# Patient Record
Sex: FEMALE | Race: WHITE | NOT HISPANIC OR LATINO | Employment: FULL TIME | URBAN - METROPOLITAN AREA
[De-identification: names, ages, dates, MRNs, and addresses within clinical notes are randomized per-mention and may not be internally consistent; named-entity substitution may affect disease eponyms.]

---

## 2017-02-20 ENCOUNTER — OFFICE VISIT - HEALTHEAST (OUTPATIENT)
Dept: FAMILY MEDICINE | Facility: CLINIC | Age: 22
End: 2017-02-20

## 2017-02-20 DIAGNOSIS — F41.1 ANXIETY STATE: ICD-10-CM

## 2017-02-20 ASSESSMENT — MIFFLIN-ST. JEOR: SCORE: 1540.22

## 2017-05-11 ENCOUNTER — COMMUNICATION - HEALTHEAST (OUTPATIENT)
Dept: FAMILY MEDICINE | Facility: CLINIC | Age: 22
End: 2017-05-11

## 2017-05-11 DIAGNOSIS — F41.1 ANXIETY STATE: ICD-10-CM

## 2017-05-31 ENCOUNTER — RECORDS - HEALTHEAST (OUTPATIENT)
Dept: ADMINISTRATIVE | Facility: OTHER | Age: 22
End: 2017-05-31

## 2017-08-01 ENCOUNTER — OFFICE VISIT - HEALTHEAST (OUTPATIENT)
Dept: FAMILY MEDICINE | Facility: CLINIC | Age: 22
End: 2017-08-01

## 2017-08-01 DIAGNOSIS — D48.5 NEOPLASM OF UNCERTAIN BEHAVIOR OF SKIN: ICD-10-CM

## 2017-08-01 DIAGNOSIS — Z30.41 SURVEILLANCE OF CONTRACEPTIVE PILL: ICD-10-CM

## 2017-08-01 DIAGNOSIS — Z00.00 ROUTINE GENERAL MEDICAL EXAMINATION AT A HEALTH CARE FACILITY: ICD-10-CM

## 2017-08-01 DIAGNOSIS — F41.1 ANXIETY STATE: ICD-10-CM

## 2017-08-01 DIAGNOSIS — R82.90 ABNORMAL URINALYSIS: ICD-10-CM

## 2017-08-01 LAB
CHOLEST SERPL-MCNC: 230 MG/DL
FASTING STATUS PATIENT QL REPORTED: NO
HDLC SERPL-MCNC: 64 MG/DL
LDLC SERPL CALC-MCNC: 151 MG/DL
TRIGL SERPL-MCNC: 74 MG/DL

## 2017-08-01 ASSESSMENT — MIFFLIN-ST. JEOR: SCORE: 1550.1

## 2017-08-04 LAB
HPV INTERPRETATION - HISTORICAL: NORMAL
HPV INTERPRETER - HISTORICAL: NORMAL

## 2017-08-23 ENCOUNTER — COMMUNICATION - HEALTHEAST (OUTPATIENT)
Dept: FAMILY MEDICINE | Facility: CLINIC | Age: 22
End: 2017-08-23

## 2017-08-23 DIAGNOSIS — F41.1 ANXIETY STATE: ICD-10-CM

## 2018-08-27 ENCOUNTER — COMMUNICATION - HEALTHEAST (OUTPATIENT)
Dept: FAMILY MEDICINE | Facility: CLINIC | Age: 23
End: 2018-08-27

## 2018-08-27 DIAGNOSIS — Z30.41 SURVEILLANCE OF CONTRACEPTIVE PILL: ICD-10-CM

## 2020-06-16 ENCOUNTER — OFFICE VISIT - HEALTHEAST (OUTPATIENT)
Dept: FAMILY MEDICINE | Facility: CLINIC | Age: 25
End: 2020-06-16

## 2020-06-16 DIAGNOSIS — R21 RASH: ICD-10-CM

## 2020-06-16 DIAGNOSIS — D70.9 NEUTROPENIA, UNSPECIFIED TYPE (H): ICD-10-CM

## 2020-06-16 DIAGNOSIS — R82.90 ABNORMAL URINALYSIS: ICD-10-CM

## 2020-06-16 DIAGNOSIS — K59.01 SLOW TRANSIT CONSTIPATION: ICD-10-CM

## 2020-06-16 ASSESSMENT — PATIENT HEALTH QUESTIONNAIRE - PHQ9: SUM OF ALL RESPONSES TO PHQ QUESTIONS 1-9: 0

## 2020-06-16 ASSESSMENT — ANXIETY QUESTIONNAIRES
6. BECOMING EASILY ANNOYED OR IRRITABLE: NOT AT ALL
2. NOT BEING ABLE TO STOP OR CONTROL WORRYING: NOT AT ALL
1. FEELING NERVOUS, ANXIOUS, OR ON EDGE: SEVERAL DAYS
IF YOU CHECKED OFF ANY PROBLEMS ON THIS QUESTIONNAIRE, HOW DIFFICULT HAVE THESE PROBLEMS MADE IT FOR YOU TO DO YOUR WORK, TAKE CARE OF THINGS AT HOME, OR GET ALONG WITH OTHER PEOPLE: NOT DIFFICULT AT ALL
3. WORRYING TOO MUCH ABOUT DIFFERENT THINGS: NOT AT ALL
7. FEELING AFRAID AS IF SOMETHING AWFUL MIGHT HAPPEN: NOT AT ALL
5. BEING SO RESTLESS THAT IT IS HARD TO SIT STILL: NOT AT ALL
GAD7 TOTAL SCORE: 1
4. TROUBLE RELAXING: NOT AT ALL

## 2020-06-18 ENCOUNTER — AMBULATORY - HEALTHEAST (OUTPATIENT)
Dept: LAB | Facility: CLINIC | Age: 25
End: 2020-06-18

## 2020-06-18 DIAGNOSIS — R82.90 ABNORMAL URINALYSIS: ICD-10-CM

## 2020-06-18 DIAGNOSIS — K59.01 SLOW TRANSIT CONSTIPATION: ICD-10-CM

## 2020-06-18 DIAGNOSIS — R21 RASH: ICD-10-CM

## 2020-06-18 DIAGNOSIS — D70.9 NEUTROPENIA, UNSPECIFIED TYPE (H): ICD-10-CM

## 2020-06-18 LAB
ALBUMIN SERPL-MCNC: 4 G/DL (ref 3.5–5)
ALBUMIN UR-MCNC: NEGATIVE MG/DL
ALP SERPL-CCNC: 48 U/L (ref 45–120)
ALT SERPL W P-5'-P-CCNC: 11 U/L (ref 0–45)
ANION GAP SERPL CALCULATED.3IONS-SCNC: 9 MMOL/L (ref 5–18)
APPEARANCE UR: CLEAR
AST SERPL W P-5'-P-CCNC: 13 U/L (ref 0–40)
BACTERIA #/AREA URNS HPF: ABNORMAL HPF
BASOPHILS # BLD AUTO: 0 THOU/UL (ref 0–0.2)
BASOPHILS NFR BLD AUTO: 0 % (ref 0–2)
BILIRUB SERPL-MCNC: 0.4 MG/DL (ref 0–1)
BILIRUB UR QL STRIP: NEGATIVE
BUN SERPL-MCNC: 14 MG/DL (ref 8–22)
C REACTIVE PROTEIN LHE: 0.4 MG/DL (ref 0–0.8)
CALCIUM SERPL-MCNC: 9.6 MG/DL (ref 8.5–10.5)
CHLORIDE BLD-SCNC: 104 MMOL/L (ref 98–107)
CO2 SERPL-SCNC: 25 MMOL/L (ref 22–31)
COLOR UR AUTO: YELLOW
CREAT SERPL-MCNC: 0.89 MG/DL (ref 0.6–1.1)
EOSINOPHIL # BLD AUTO: 0.1 THOU/UL (ref 0–0.4)
EOSINOPHIL NFR BLD AUTO: 2 % (ref 0–6)
ERYTHROCYTE [DISTWIDTH] IN BLOOD BY AUTOMATED COUNT: 11.2 % (ref 11–14.5)
ERYTHROCYTE [SEDIMENTATION RATE] IN BLOOD BY WESTERGREN METHOD: 13 MM/HR (ref 0–20)
GFR SERPL CREATININE-BSD FRML MDRD: >60 ML/MIN/1.73M2
GLUCOSE BLD-MCNC: 87 MG/DL (ref 70–125)
GLUCOSE UR STRIP-MCNC: NEGATIVE MG/DL
HCT VFR BLD AUTO: 37 % (ref 35–47)
HGB BLD-MCNC: 12.8 G/DL (ref 12–16)
HGB UR QL STRIP: ABNORMAL
KETONES UR STRIP-MCNC: NEGATIVE MG/DL
LEUKOCYTE ESTERASE UR QL STRIP: NEGATIVE
LYMPHOCYTES # BLD AUTO: 1.6 THOU/UL (ref 0.8–4.4)
LYMPHOCYTES NFR BLD AUTO: 29 % (ref 20–40)
MCH RBC QN AUTO: 30.7 PG (ref 27–34)
MCHC RBC AUTO-ENTMCNC: 34.5 G/DL (ref 32–36)
MCV RBC AUTO: 89 FL (ref 80–100)
MONOCYTES # BLD AUTO: 0.4 THOU/UL (ref 0–0.9)
MONOCYTES NFR BLD AUTO: 7 % (ref 2–10)
NEUTROPHILS # BLD AUTO: 3.5 THOU/UL (ref 2–7.7)
NEUTROPHILS NFR BLD AUTO: 63 % (ref 50–70)
NITRATE UR QL: NEGATIVE
PH UR STRIP: 6.5 [PH] (ref 5–8)
PLATELET # BLD AUTO: 317 THOU/UL (ref 140–440)
PMV BLD AUTO: 6.9 FL (ref 7–10)
POTASSIUM BLD-SCNC: 4.5 MMOL/L (ref 3.5–5)
PROT SERPL-MCNC: 7.3 G/DL (ref 6–8)
RBC # BLD AUTO: 4.15 MILL/UL (ref 3.8–5.4)
RBC #/AREA URNS AUTO: ABNORMAL HPF
RHEUMATOID FACT SERPL-ACNC: <15 IU/ML (ref 0–30)
SODIUM SERPL-SCNC: 138 MMOL/L (ref 136–145)
SP GR UR STRIP: 1.01 (ref 1–1.03)
SQUAMOUS #/AREA URNS AUTO: ABNORMAL LPF
TSH SERPL DL<=0.005 MIU/L-ACNC: 1.18 UIU/ML (ref 0.3–5)
UROBILINOGEN UR STRIP-ACNC: ABNORMAL
WBC #/AREA URNS AUTO: ABNORMAL HPF
WBC: 5.6 THOU/UL (ref 4–11)

## 2020-06-19 ENCOUNTER — COMMUNICATION - HEALTHEAST (OUTPATIENT)
Dept: FAMILY MEDICINE | Facility: CLINIC | Age: 25
End: 2020-06-19

## 2020-06-19 LAB
25(OH)D3 SERPL-MCNC: 29.9 NG/ML (ref 30–80)
25(OH)D3 SERPL-MCNC: 29.9 NG/ML (ref 30–80)
ANA SER QL: 0.9 U
B BURGDOR IGG+IGM SER QL: 0.64 INDEX VALUE
BACTERIA SPEC CULT: NO GROWTH

## 2020-11-28 ENCOUNTER — COMMUNICATION - HEALTHEAST (OUTPATIENT)
Dept: FAMILY MEDICINE | Facility: CLINIC | Age: 25
End: 2020-11-28

## 2020-11-28 DIAGNOSIS — Z30.41 SURVEILLANCE OF CONTRACEPTIVE PILL: ICD-10-CM

## 2020-11-28 DIAGNOSIS — F32.9 MAJOR DEPRESSIVE DISORDER WITH SINGLE EPISODE, REMISSION STATUS UNSPECIFIED: ICD-10-CM

## 2020-11-30 RX ORDER — NORGESTIMATE AND ETHINYL ESTRADIOL 7DAYSX3 28
1 KIT ORAL DAILY
Qty: 2 PACKAGE | Refills: 0 | Status: SHIPPED | OUTPATIENT
Start: 2020-11-30 | End: 2022-11-10

## 2020-11-30 RX ORDER — BUPROPION HYDROCHLORIDE 150 MG/1
150 TABLET ORAL EVERY MORNING
Qty: 60 TABLET | Refills: 0 | Status: SHIPPED | OUTPATIENT
Start: 2020-11-30 | End: 2021-07-19

## 2021-05-27 ASSESSMENT — PATIENT HEALTH QUESTIONNAIRE - PHQ9: SUM OF ALL RESPONSES TO PHQ QUESTIONS 1-9: 0

## 2021-05-28 ASSESSMENT — ANXIETY QUESTIONNAIRES: GAD7 TOTAL SCORE: 1

## 2021-05-30 VITALS — HEIGHT: 68 IN | WEIGHT: 166.6 LBS | BODY MASS INDEX: 25.25 KG/M2

## 2021-05-30 VITALS — BODY MASS INDEX: 25.98 KG/M2 | WEIGHT: 169.6 LBS | HEIGHT: 68 IN | BODY MASS INDEX: 25.52 KG/M2

## 2021-05-31 VITALS — WEIGHT: 171.4 LBS | BODY MASS INDEX: 26.9 KG/M2 | HEIGHT: 67 IN

## 2021-06-08 NOTE — PROGRESS NOTES
"Arcelia Andersen is a 25 y.o. female who is being evaluated via a billable video visit.      The patient has been notified of following:     \"This video visit will be conducted via a call between you and your physician/provider. We have found that certain health care needs can be provided without the need for an in-person physical exam.  This service lets us provide the care you need with a video conversation.  If a prescription is necessary we can send it directly to your pharmacy.  If lab work is needed we can place an order for that and you can then stop by our lab to have the test done at a later time.    Video visits are billed at different rates depending on your insurance coverage. Please reach out to your insurance provider with any questions.    If during the course of the call the physician/provider feels a video visit is not appropriate, you will not be charged for this service.\"    Patient has given verbal consent to a Video visit? Yes    Will anyone else be joining your video visit? No        Video Start Time: 131 pm    Additional provider notes:   Patient is seen today via video visit rather than office visit due to the COVID 19 outbreak pandemic.  This is done for the protection of patient from potential exposure to this virus by coming to the clinic.    PROGRESS NOTE   6/16/2020    SUBJECTIVE:  Arcelia Andersen is a 25 y.o. female  who presents for   Chief Complaint   Patient presents with     Hematuria     Low white blood cell count and blood in her urine while living in [place]. Moved back to [place] and would like to follow up      Rash     Rash on nose and cheeks for over a year      Patient is being evaluated today for follow-up of some abnormalities that were found when she was living in [place].  She just recently moved back to [place] and is going to be staying back in [place] so would like to follow-up on these.  She was noted to have a low white blood cell count and blood in her urine " while she was living in [place].  She was supposed to follow-up but never did.  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  She starts her job at [employer] on 6/29/2020.  She notes that last fall she was in to see her primary doctor in [place] and they found some blood in her urine.  They also found at that time that she had low white cell counts.  He has been feeling well and did have a physical last fall.  Everything else seemed to be fine except she had the blood in her urine.  Her provider in [place] wanted to refer her to hematology for this combination of symptoms but she never went because they moved.  She does note that she is also had a rash on the upper part of her cheeks and on the tip of her nose.  This is been there for probably the last year or so.  She definitely notes that she has some sun sensitivity to that it seems like her nose is very flaky and her cheeks are also flaky at certain times.  It seems like it gets a little bit better and then it seems to get worse again.  She is tried numerous different lotions and moisturizing creams and nothing really seems to help.  She did try some hydrocortisone cream thinking that perhaps that would help and that helped a little bit but not to a significant extent.  She otherwise has overall been feeling well.    Patient Active Problem List   Diagnosis     Sore Throat     Major Depression, Single Episode     Anxiety     Anemia     Blood In Urine       Current Outpatient Medications   Medication Sig Dispense Refill     buPROPion (WELLBUTRIN XL) 150 MG 24 hr tablet Take 150 mg by mouth.       TRINESSA, 28, 0.18/0.215/0.25 mg-35 mcg (28) Tab tablet TAKE 1 TABLET BY MOUTH DAILY 84 tablet 0     No current facility-administered medications for this visit.        Allergies   Allergen Reactions     Tree Nut Hives, Itching and Shortness Of Breath     Nuts - Unspecified Hives and Itching     Annotation: tree nuts cause throat  swelling         Past Medical History:   Diagnosis Date     Anxiety      Major depressive disorder, recurrent episode, unspecified        No past surgical history on file.    Social History     Tobacco Use   Smoking Status Never Smoker   Smokeless Tobacco Never Used       OBJECTIVE:     There were no vitals taken for this visit.    Physical Exam:  GENERAL APPEARANCE: A&A, NAD, well hydrated, well nourished  Patient is fully active and active participant in the video visit today.  There is not appear to be any significant abnormalities noted on exam.  She does have some redness over her cheek area on her face bilaterally as well as on the tip of her nose.  There is no evidence for secondary infection and she does not feel any warmth to the touch.  SKIN:  Normal skin turgor, no lesions/rashes   EXTREMITY: no swelling noted.  Full range of motion of all 4 extremities.   NEURO: no gross deficits   PSYCHIATRIC;  Mood appropriate, memory intact    LABS:     Recent Results (from the past 240 hour(s))   Antinuclear Antibody () Cascade   Result Value Ref Range     Screen Cascade 0.9 <=2.9 U   Comprehensive Metabolic Panel   Result Value Ref Range    Sodium 138 136 - 145 mmol/L    Potassium 4.5 3.5 - 5.0 mmol/L    Chloride 104 98 - 107 mmol/L    CO2 25 22 - 31 mmol/L    Anion Gap, Calculation 9 5 - 18 mmol/L    Glucose 87 70 - 125 mg/dL    BUN 14 8 - 22 mg/dL    Creatinine 0.89 0.60 - 1.10 mg/dL    GFR MDRD Af Amer >60 >60 mL/min/1.73m2    GFR MDRD Non Af Amer >60 >60 mL/min/1.73m2    Bilirubin, Total 0.4 0.0 - 1.0 mg/dL    Calcium 9.6 8.5 - 10.5 mg/dL    Protein, Total 7.3 6.0 - 8.0 g/dL    Albumin 4.0 3.5 - 5.0 g/dL    Alkaline Phosphatase 48 45 - 120 U/L    AST 13 0 - 40 U/L    ALT 11 0 - 45 U/L   C-Reactive Protein   Result Value Ref Range    CRP 0.4 0.0 - 0.8 mg/dL   Lyme Antibody Cascade   Result Value Ref Range    Lyme Antibody Cascade 0.64 <0.90 Index Value   Rheumatoid Factor Quant   Result Value Ref Range     Rheumatoid Factor Quantitative <15.0 0 - 30 IU/mL   Erythrocyte Sedimentation Rate   Result Value Ref Range    Sed Rate 13 0 - 20 mm/hr   Vitamin D, Total (25-Hydroxy)   Result Value Ref Range    Vitamin D, Total (25-Hydroxy) 29.9 (L) 30.0 - 80.0 ng/mL   Thyroid Cascade   Result Value Ref Range    TSH 1.18 0.30 - 5.00 uIU/mL   HM1 (CBC with Diff)   Result Value Ref Range    WBC 5.6 4.0 - 11.0 thou/uL    RBC 4.15 3.80 - 5.40 mill/uL    Hemoglobin 12.8 12.0 - 16.0 g/dL    Hematocrit 37.0 35.0 - 47.0 %    MCV 89 80 - 100 fL    MCH 30.7 27.0 - 34.0 pg    MCHC 34.5 32.0 - 36.0 g/dL    RDW 11.2 11.0 - 14.5 %    Platelets 317 140 - 440 thou/uL    MPV 6.9 (L) 7.0 - 10.0 fL    Neutrophils % 63 50 - 70 %    Lymphocytes % 29 20 - 40 %    Monocytes % 7 2 - 10 %    Eosinophils % 2 0 - 6 %    Basophils % 0 0 - 2 %    Neutrophils Absolute 3.5 2.0 - 7.7 thou/uL    Lymphocytes Absolute 1.6 0.8 - 4.4 thou/uL    Monocytes Absolute 0.4 0.0 - 0.9 thou/uL    Eosinophils Absolute 0.1 0.0 - 0.4 thou/uL    Basophils Absolute 0.0 0.0 - 0.2 thou/uL   Urinalysis Macroscopic   Result Value Ref Range    Color, UA Yellow Colorless, Yellow, Straw, Light Yellow    Clarity, UA Clear Clear    Glucose, UA Negative Negative    Bilirubin, UA Negative Negative    Ketones, UA Negative Negative    Specific Gravity, UA 1.015 1.005 - 1.030    Blood, UA Moderate (!) Negative    pH, UA 6.5 5.0 - 8.0    Protein, UA Negative Negative mg/dL    Urobilinogen, UA 0.2 E.U./dL 0.2 E.U./dL, 1.0 E.U./dL    Nitrite, UA Negative Negative    Leukocytes, UA Negative Negative   Culture, Urine    Specimen: Urine   Result Value Ref Range    Culture No Growth    Urine,Microscopic   Result Value Ref Range    Bacteria, UA None Seen None Seen hpf    RBC, UA 10-25 (!) None Seen, 0-2 hpf    WBC, UA 0-5 None Seen, 0-5 hpf    Squam Epithel, UA 0-5 None Seen, 0-5 lpf       ASSESSMENT/PLAN:     Neutropenia, unspecified type (H) [D70.9]      1. Neutropenia, unspecified type (H)  -  HM1(CBC and Differential); Future    2. Abnormal urinalysis  - Urinalysis Macroscopic; Future  - Urine,Microscopic; Future  - Culture, Urine; Future    3. Rash  - Antinuclear Antibody () Cascade; Future  - Comprehensive Metabolic Panel; Future  - C-Reactive Protein; Future  - Lyme Antibody Cascade; Future  - Rheumatoid Factor Quant; Future  - Erythrocyte Sedimentation Rate; Future  - Vitamin D, Total (25-Hydroxy); Future  - Thyroid Cascade; Future    4. Slow transit constipation  - Thyroid Cascade; Future      Patient is desiring follow-up from a low white blood cell count that was found last fall as well as blood in her urine.  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  She would like to follow-up with the abnormalities that were found in [place].  I think the first step is to repeat the urinalysis today as well as a CBC to see if indeed she continues to have a low white blood cell count as well as blood in her urine.  Urinalysis as well as a CBC were ordered today.  She will make an appointment for a lab only visit to have these drawn.  She does have a rash on her nose as well as on her cheek region.  She has a brother-in-law who is a doctor and they expressed that they were concerned about possible autoimmune issues that could be contributing to the rash that she has had.  She is had this rash now for about the last year or so.  We will go ahead and order  as well as metabolic panel C-reactive protein Lyme titer rheumatoid factor sed rate vitamin D and thyroid cascade to evaluate for possible autoimmune issues which could be contributing to the rash.  It does not seem like the rash is getting any worse but it is also not getting any better.  I will contact her with results of the labs when they return.  She has not had any symptoms that we suggest of her thyroid level being off other than she will  have constipation.  We discussed that that may be thyroid related and so again we will check a thyroid level for that reason as well.  All of patient's questions were answered today.  She has additional questions or concerns will let me know.  I will contact her with results of the lab work and that we can decide on further work-up following that.        Video-Visit Details    Type of service:  Video Visit    Video End Time (time video stopped): 144 pm  Originating Location (pt. Location): Home    Distant Location (provider location):  [facility]     Platform used for Video Visit: Ketan Ferguson MD

## 2021-06-09 NOTE — PROGRESS NOTES
PROGRESS NOTE   2/20/2017    SUBJECTIVE:  Arcelia French is a 21 y.o. female  who presents for   Chief Complaint   Patient presents with     Depression     talk would like to restart medications     Anxiety     Patient comes in today because she like to restart her medications for anxiety.  She has been on anxiety medications in the past which worked well.  She went off of them when she joined the  because she thought she could not be on them when she was in the .  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  She continues in school in [place] and school is going well.  She is doing very well in her classes and is quite pleased with how everything is going.  She overall feels like things are doing fine but she finds herself worrying about things that she really should not be worried about.  She gets super nervous about upcoming social events.  She will worry about how the conversations are going and she will be playing how the conversation might go even before she has the conversation.  She is very worried about the future and not necessarily what she is going to do in the future but just what may happen tomorrow in her day-to-day lives.  She is always thinking that she is room to school because she did not study the night before but overall she is doing well in school and has nothing to worry about in terms of her grades are her classes.  Please see PHQ 9 and ADY which were both completed in their entirety today.  She is not suicidal or homicidal.  She never hurt herself but she wonders sometimes what is the point of life.  She notes that before she went into the  she was on Celexa and that was working very well for her anxiety.  She would like to restart that.  She is fairly certain that she just needs to be on medications for this as several of her sisters as well as her dad are all on medication  "and have been helped tremendously by this.     Patient Active Problem List   Diagnosis     Sore Throat     Major Depression, Single Episode     Anxiety     Anemia     Blood In Urine       Current Outpatient Prescriptions   Medication Sig Dispense Refill     norgestimate-ethinyl estradiol (TRINESSA, 28,) 0.18/0.215/0.25 mg-35 mcg (28) Tab tablet Take 1 tablet by mouth daily. 84 tablet 4     citalopram (CELEXA) 20 MG tablet Take 1 tablet (20 mg total) by mouth daily. 90 tablet 0     No current facility-administered medications for this visit.        Allergies   Allergen Reactions     Nuts - Unspecified Hives and Itching     Annotation: tree nuts cause throat swelling         Past Medical History:   Diagnosis Date     Anxiety      Major depressive disorder, recurrent episode, unspecified        History reviewed. No pertinent surgical history.    History   Smoking Status     Never Smoker   Smokeless Tobacco     Never Used       OBJECTIVE:     Visit Vitals     /62 (Patient Site: Left Arm, Patient Position: Sitting, Cuff Size: Adult Regular)     Pulse (!) 58  Comment: regular     Temp 98.2  F (36.8  C) (Oral)     Resp 12  Comment: regular     Ht 5' 7.75\" (1.721 m)     Wt 166 lb 9.6 oz (75.6 kg)     BMI 25.52 kg/m2       Physical Exam:  GENERAL APPEARANCE: A&A, NAD, well hydrated, well nourished  SKIN:  Normal skin turgor, no lesions/rashes   EXTREMITY: no edema   NEURO: no gross deficits   PSYCHIATRIC;  Mood appropriate, memory intact  A&O x3, thought processes congruent, non-tangential. No hallucinations/delusions. Insight/judgment: intact. Denies suicidal/homicidal ideations.        ASSESSMENT/PLAN:     Anxiety state [F41.1]      1. Anxiety  - citalopram (CELEXA) 20 MG tablet; Take 1 tablet (20 mg total) by mouth daily.  Dispense: 90 tablet; Refill: 0    Patient overall seems to be doing well.  She is not suicidal or homicidal.  She is finding that her anxiety is getting worse and she is wondering about things " that she really should not be worried about.  She like to restart Celexa as that has worked well for her in the past for anxiety.  I am going to go ahead and give her prescription for 3 month supply today.  She is leaving to go to [place] later today to go back to school and she is not sure when she will be back here.  School ends at the beginning of May and so for sure she will be back at the end of May and will follow-up at that time.  If she is back before then which is highly likely she will try to make a follow-up appointment with me to recheck how she is doing.  She has been on this previously and tolerated it well.  We did discuss potential side effects including nausea as well as a funny foggy feeling for the first couple of weeks that you are on the medication and she remembered that from last time and is aware that if she just keeps taking the medication this will all resolve within the first couple of weeks.  All of her questions were answered today.  Celexa was sent to the pharmacy today.  If she has any changes in her symptoms or has any problems will let me know we otherwise will see her hopefully in about a month or so but otherwise within about 3 months. Total time spent with patient was at least 25 minutes,  of which greater than fifty percent was spent in counselling and coordination of care of the above medical problems.    Sherrell Ferguson MD

## 2021-06-12 NOTE — PROGRESS NOTES
Arcelia French is a 22 y.o. here for a Pap smear and physical exam. Patient is overall doing well.  She continues to go to school in [place] and is enjoying that very much.  She continues on her TriNessa birth control and that seems to working well.  She is bleeding when she supposed to and not when she is not.  She notes that she did go off of this for a couple of months because she ran out and was not able to go get a refill and she had improvement in her constipation and diarrhea alternation.  We discussed that that would not be something that I would be suspicious would be caused by her birth control but certainly I be willing to change her to a different birth control pill and see if that is helpful.  She notes that at this point she would like to continue on the medication and as long as it is not a known side effect she would like to see how she does over the next few months.  We will go ahead and send a refill of her birth control pill to the pharmacy.  She also continues on Celexa for her anxiety.  This has worked very well for her.  She was on it for quite some time and had good results with it then went off of that and recently restarted it.  She feels like overall things are doing much better.  Please see PHQ 9 and ADY which were both completed in their entirety today.  She feels like she is wearing appropriately and not worrying about things that she should not be worried about as she was in the past.  She is not suicidal or homicidal.  She does not have any side effects from the Celexa.  She does need a refill of this medication and I will go ahead and give that to her today.  She also has a mole on her back it has been there all of her life but her sister says that it looks different than it has in the past.  She cannot see it but she is wanting she needs to see a dermatologist for that.    Healthy Habits:   Regular Exercise: Yes  Sunscreen Use: Yes  Healthy Diet: Yes  Dental Visits Regularly:  Yes  Seat Belt: Yes  Sexually active: Yes  Self Breast Exam Monthly:Yes  Hemoccults: No  Flex Sig: No  Colonoscopy: No  Lipid Profile: No  Glucose Screen: No  Prevention of Osteoporosis: No  Last Dexa: No  Guns at Home:  No  Domestic Violence:  No    Current Outpatient Medications Include:    Current Outpatient Prescriptions:      citalopram (CELEXA) 20 MG tablet, TAKE 1 TABLET(20 MG) BY MOUTH DAILY, Disp: 90 tablet, Rfl: 1     norgestimate-ethinyl estradiol (TRINESSA, 28,) 0.18/0.215/0.25 mg-35 mcg (28) Tab tablet, Take 1 tablet by mouth daily., Disp: 84 tablet, Rfl: 4    Allergies:    Allergies   Allergen Reactions     Nuts - Unspecified Hives and Itching     Annotation: tree nuts cause throat swelling         Past Medical History:   Diagnosis Date     Anxiety      Major depressive disorder, recurrent episode, unspecified        History reviewed. No pertinent surgical history.    Immunization History   Administered Date(s) Administered     DTaP, historic 1995, 1995, 1995, 09/24/1996, 01/24/2000     HPV Quadrivalent 08/08/2007, 09/16/2008, 06/14/2010     Hep A, historic 08/08/2007, 09/16/2008     Hep B, historic 04/20/1998, 05/27/1998, 11/11/1998     HiB, historic 1995, 1995, 1995, 09/24/1996     IPV 1995, 1995, 1995, 01/24/2000     Influenza, inj, historic 02/07/2012     MMR 07/02/1996     MMRV 08/08/2007     Meningococcal MCV4P 02/07/2012     PPD Test 08/14/2015     Td, historic 08/08/2007     Tdap 08/08/2007     Varicella 04/01/1997       Family History   Problem Relation Age of Onset     Asthma Father      Depression Father      Brain cancer Paternal Grandmother      Depression Sister      Depression Sister      Depression Paternal Aunt      several     Depression Paternal Grandfather      Diabetes Maternal Grandfather      Hypertension       family history of this       Social History     Social History     Marital status: Single     Spouse name: N/A      Number of children: 0     Years of education: N/A     Occupational History     student      Social History Main Topics     Smoking status: Never Smoker     Smokeless tobacco: Never Used     Alcohol use Yes      Comment: occasionally once a month     Drug use: No     Sexual activity: Yes     Partners: Male     Birth control/ protection: OCP     Other Topics Concern     Not on file     Social History Narrative       Last cholesterol:   Lab Results   Component Value Date    CHOL 230 (H) 08/01/2017    CHOL 218 (H) 06/24/2016    CHOL 180 10/20/2014     Lab Results   Component Value Date    HDL 64 08/01/2017    HDL 76 06/24/2016    HDL 62 10/20/2014     Lab Results   Component Value Date    LDLCALC 151 (H) 08/01/2017    LDLCALC 135 (H) 06/24/2016    LDLCALC 107 10/20/2014     Lab Results   Component Value Date    TRIG 74 08/01/2017    TRIG 36 06/24/2016    TRIG 56 10/20/2014       Last mammogram: never    Birth Control Method: OCP  High Risk/Behavior: none    PREVENTATIVE SERVICES  Preventative services were reviewed today, 8/1/2017    LMP: Patient's last menstrual period was 07/03/2017.  Menstrual Regularity: monthly  Flow: normal    REVIEW OF SYSTEMS:  Denies fever, chills, visual changes, fatigue, myalgias, nasal congestion, rhinorrhea, ear pain or discharge, sore throat, swollen glands, breast mass, nipple discharge, breast changes, abdominal pain, nausea, vomiting, diarrhea, constipation, cough, shortness of breath, chest pain, weight change, change in bowel habits, melena, rectal bleeding, dysuria, frequency, urgency, hematuria, polyuria, polydipsia, polyphagia, joint pain or swelling or erythema, edema, rash, weakness, paresthesias, vaginal discharge or bleeding or mood changes.  Remainder of review of systems was negative.      PHYSICAL EXAM:  On exam, patient is a WD, WN 22 y.o. female in NAD.  BP 96/58 (Patient Site: Left Arm, Patient Position: Sitting, Cuff Size: Adult Regular)  Pulse 68 Comment: regular   "Temp 98.1  F (36.7  C) (Oral)   Resp 14 Comment: REGULAR  Ht 5' 7\" (1.702 m)  Wt 171 lb 6.4 oz (77.7 kg)  LMP 07/03/2017  Breastfeeding? No  BMI 26.85 kg/m2  Head normocephalic, atraumatic.  Eyes PERRL, ears TM s clear bilaterally.  Throat without significant erythemia or exudate.  Neck was supple, full range of motion. No significant lymphadenopathy or thyromegaly was appreciated.  Lungs clear to auscultation  Heart regular rate and rhythm.  Breast exam was done. No masses were appreciated. Axilla were clear bilaterally. No nipple discharge was appreciated. Self breast exam was reviewed and taught today.  Abdomen was soft, nontender, nondistended. No masses or organomegaly were palpated. Positive bowel sounds were appreciated.  Extremities with full range of motion of all 4 extremities were noted.  Deep tendon reflexes were equal and symmetrical. Motor and sensation were intact to both the upper and lower extremities.  Cranial nerves 2 through 12 were grossly intact.  EOM were intact.  Pelvic exam was done.  External genitalia appeared normal. Speculum was introduced and the Pap smear obtained. Bimanual exam revealed uterus to be normal size and adenexa without palpable masses.   On exam of her back in the mid back region directly over the spine about 4 cm below the base of the neck she has a 1 cm long oval brown mole which has multiple dark spots within it.  There is no evidence for any secondary infection in the area redness warmth to the touch or tenderness to palpation.  The lesion has fairly symmetrical borders but does definitely have multiple colors within the lesion.      Recent Results (from the past 240 hour(s))   Hemoglobin   Result Value Ref Range    Hemoglobin 12.1 12.0 - 16.0 g/dL   Urinalysis Macroscopic   Result Value Ref Range    Color, UA Yellow Colorless, Yellow, Straw, Light Yellow    Clarity, UA Clear Clear    Glucose, UA Negative Negative    Bilirubin, UA Negative Negative    Ketones, UA " Negative Negative    Specific Gravity, UA 1.010 1.005 - 1.030    Blood, UA Large (!) Negative    pH, UA 5.5 5.0 - 8.0    Protein, UA Negative Negative mg/dL    Urobilinogen, UA 0.2 E.U./dL 0.2 E.U./dL, 1.0 E.U./dL    Nitrite, UA Negative Negative    Leukocytes, UA Negative Negative   Lipid Cascade   Result Value Ref Range    Cholesterol 230 (H) <=199 mg/dL    Triglycerides 74 <=149 mg/dL    HDL Cholesterol 64 >=50 mg/dL    LDL Calculated 151 (H) <=129 mg/dL    Patient Fasting > 8hrs? No    HPV Cascade (PCR)   Result Value Ref Range    Interpretation No HPV Type(s) Detected No HPV Type(s) Detected, No High Risk HPV Type(s) Detected, DNA Quantity Not Sufficient     Jb Corona MD, [facility]        ASSESSMENT: 22 y.o. female physical exam and pap smear.    PLAN:     Routine general medical examination at a health care facility [Z00.00]    1. Routine general medical examination at a health care facility  - Hemoglobin  - Urinalysis Macroscopic  - Lipid Cascade  - Gynecologic Cytology (PAP Smear)  - HPV Cascade (PCR)    2. Anxiety  - citalopram (CELEXA) 20 MG tablet; TAKE 1 TABLET(20 MG) BY MOUTH DAILY  Dispense: 90 tablet; Refill: 1    3. Surveillance of contraceptive pill  - norgestimate-ethinyl estradiol (TRINESSA, 28,) 0.18/0.215/0.25 mg-35 mcg (28) Tab tablet; Take 1 tablet by mouth daily.  Dispense: 84 tablet; Refill: 4    4. Neoplasm of uncertain behavior of skin      Patient is a 22 y.o. female who is overall doing well.  She is doing well on her birth control pills and would like a refill of those.  That prescription was sent to the pharmacy today.  She is also doing well on her Celexa which seems to be controlling her anxiety fairly well.  We will go ahead and refill that prescription as well.  We should see her back in about 6 months for follow-up of her Celexa.  Prescription for birth control pills was sent for 1 year supply.  She does have a mole on her back which is concerning.  It  does have multiple different colors within it and I do think needs further evaluation.  She notes that she is leaving to go to school later today and she does have a dermatologist in [place] that she can see.  She will set up an appointment with a dermatologist in [place] in the near future.  If she is my assistance or if she needs any referrals to achieve that will certainly let me know.  All of her questions and concerns were addressed today.  If she has additional problems or concerns should let me know.    Will contact her with the results of the labs when available.    Sherrell Ferguson M.D.

## 2021-06-13 NOTE — TELEPHONE ENCOUNTER
3rd attempt at contacting pt. Did not reach her. LM that she is due for a visit. [facility]  [facility] due for visit. Will complete task

## 2021-06-13 NOTE — TELEPHONE ENCOUNTER
RN cannot approve Refill Request    RN can NOT refill this medication medication not on med list and Last filled 2018.. Last office visit: 2/20/2017 Sherrell Ferguson MD Last Physical: 8/1/2017 Last MTM visit: Visit date not found Last visit same specialty: 2/20/2017 Sherrell Ferguson MD.  Next visit within 3 mo: Visit date not found  Next physical within 3 mo: Visit date not found      Yolanda Arguello, [facility] Triage/Med Refill 11/30/2020    Requested Prescriptions   Refused Prescriptions Disp Refills     norgestimate-ethinyl estradioL (TRINESSA, 28,) 0.18/0.215/0.25 mg-35 mcg (28) tablet 84 tablet 0     Sig: Take 1 tablet by mouth daily.       Oral Contraceptives Protocol Passed - 11/28/2020  7:47 AM        Passed - Visit with PCP or prescribing provider visit in last 12 months      Last office visit with prescriber/PCP: 2/20/2017 Sherrell Ferguson MD OR same dept: Visit date not found OR same specialty: 2/20/2017 Sherrell Ferguson MD  Last physical: 8/1/2017 Last MTM visit: Visit date not found   Next visit within 3 mo: Visit date not found  Next physical within 3 mo: Visit date not found  Prescriber OR PCP: Sherrell Ferguson MD  Last diagnosis associated with med order: 1. Surveillance of contraceptive pill  - norgestimate-ethinyl estradioL (TRINESSA, 28,) 0.18/0.215/0.25 mg-35 mcg (28) tablet; Take 1 tablet by mouth daily.  Dispense: 84 tablet; Refill: 0    If protocol passes may refill for 12 months if within 3 months of last provider visit (or a total of 15 months).

## 2021-06-13 NOTE — TELEPHONE ENCOUNTER
Left message to call back for: pt.  Information to relay to patient:  Help patient set up appointment for physical within the next two months    I called [facility] and let them know to cancel the birth control

## 2021-06-13 NOTE — TELEPHONE ENCOUNTER
Please call patient and let her know that I did send in a 2-month supply of her birth control pills to the pharmacy.  I sent this to the [facility] in [place].  She is overdue for a physical exam.      Please assist patient with scheduling a physical exam with myself sometime within the next 2 months.    Please call the [facility] in [place] and let them know that the prescription that I sent for birth control pills was sent in error.  It was resent to the [facility] in [place].

## 2021-06-27 ENCOUNTER — HEALTH MAINTENANCE LETTER (OUTPATIENT)
Age: 26
End: 2021-06-27

## 2021-07-13 DIAGNOSIS — F32.9 MAJOR DEPRESSIVE DISORDER WITH SINGLE EPISODE, REMISSION STATUS UNSPECIFIED: ICD-10-CM

## 2021-07-15 NOTE — TELEPHONE ENCOUNTER
"Former patient of ??? & has not established care with another provider.  Please assign refill request to covering provider per Clinic standard process.    Routing refill request to provider for review/approval because:  Drug not on the [facility] [place]   Visit > 1 year ago  No pcp    Last Written Prescription Date:  11/30/20  Last Fill Quantity: 60,  # refills: 0   Last office visit provider:  6/16/20     Requested Prescriptions   Pending Prescriptions Disp Refills     buPROPion (WELLBUTRIN XL) 150 MG 24 hr tablet [Pharmacy Med Name: BUPROPION HCL  MG TABLET] 30 tablet 1     Sig: TAKE 1 TABLET BY MOUTH EVERY DAY IN THE MORNING       SSRIs Protocol Failed - 7/13/2021 12:14 AM        Failed - PHQ-9 score less than 5 in past 6 months     Please review last PHQ-9 score.           Failed - Recent (6 mo) or future (30 days) visit within the authorizing provider's specialty     Patient had office visit in the last 6 months or has a visit in the next 30 days with authorizing provider or within the authorizing provider's specialty.  See \"Patient Info\" tab in inbasket, or \"Choose Columns\" in Meds & Orders section of the refill encounter.            Passed - Medication is Bupropion     If the medication is Bupropion (Wellbutrin), and the patient is taking for smoking cessation; OK to refill.          Passed - Medication is active on med list        Passed - Patient is age 18 or older        Passed - No active pregnancy on record        Passed - No positive pregnancy test in last 12 months             Jarrett Kelly RN 07/15/21 9:08 AM  "

## 2021-07-19 RX ORDER — BUPROPION HYDROCHLORIDE 150 MG/1
TABLET ORAL
Qty: 30 TABLET | Refills: 0 | Status: SHIPPED | OUTPATIENT
Start: 2021-07-19 | End: 2021-08-16

## 2021-07-19 NOTE — TELEPHONE ENCOUNTER
Please call her and let her know that we did refill her medication for 30 days however she needs to be seen in follow up with her regular doctor prior to any further refills.

## 2021-08-12 DIAGNOSIS — F32.9 MAJOR DEPRESSIVE DISORDER WITH SINGLE EPISODE, REMISSION STATUS UNSPECIFIED: ICD-10-CM

## 2021-08-16 RX ORDER — BUPROPION HYDROCHLORIDE 150 MG/1
TABLET ORAL
Qty: 60 TABLET | Refills: 0 | Status: SHIPPED | OUTPATIENT
Start: 2021-08-16 | End: 2023-01-12

## 2021-08-16 NOTE — TELEPHONE ENCOUNTER
Please call her and let her know that we did refill her medication for 60 days however she needs to be seen in follow up with her regular doctor prior to any further refills.

## 2021-08-16 NOTE — TELEPHONE ENCOUNTER
"Routing refill request to provider for review/approval because:  PHQ 9 score  PHQ 6/16/2020   PHQ-9 Total Score 0   Q9: Thoughts of better off dead/self-harm past 2 weeks Not at all   Patient needs to be seen because it has been more than 1 year since last office visit.    Last Written Prescription Date:  7/19/21  Last Fill Quantity: 30,  # refills: 0   Last office visit provider:  6/16/20     Requested Prescriptions   Pending Prescriptions Disp Refills     buPROPion (WELLBUTRIN XL) 150 MG 24 hr tablet [Pharmacy Med Name: BUPROPION HCL  MG TABLET] 30 tablet 0     Sig: TAKE 1 TABLET BY MOUTH EVERY DAY IN THE MORNING       SSRIs Protocol Failed - 8/12/2021 12:15 AM        Failed - PHQ-9 score less than 5 in past 6 months     Please review last PHQ-9 score.           Failed - Recent (6 mo) or future (30 days) visit within the authorizing provider's specialty     Patient had office visit in the last 6 months or has a visit in the next 30 days with authorizing provider or within the authorizing provider's specialty.  See \"Patient Info\" tab in inbasket, or \"Choose Columns\" in Meds & Orders section of the refill encounter.            Passed - Medication is Bupropion     If the medication is Bupropion (Wellbutrin), and the patient is taking for smoking cessation; OK to refill.          Passed - Medication is active on med list        Passed - Patient is age 18 or older        Passed - No active pregnancy on record        Passed - No positive pregnancy test in last 12 months             Jarrett Kelly RN 08/16/21 6:59 AM  "

## 2021-10-17 ENCOUNTER — HEALTH MAINTENANCE LETTER (OUTPATIENT)
Age: 26
End: 2021-10-17

## 2022-06-27 ENCOUNTER — LAB REQUISITION (OUTPATIENT)
Dept: LAB | Facility: CLINIC | Age: 27
End: 2022-06-27

## 2022-06-27 PROCEDURE — 86481 TB AG RESPONSE T-CELL SUSP: CPT | Performed by: INTERNAL MEDICINE

## 2022-06-28 LAB
GAMMA INTERFERON BACKGROUND BLD IA-ACNC: 0.01 IU/ML
M TB IFN-G BLD-IMP: NEGATIVE
M TB IFN-G CD4+ BCKGRND COR BLD-ACNC: 9.99 IU/ML
MITOGEN IGNF BCKGRD COR BLD-ACNC: 0.01 IU/ML
MITOGEN IGNF BCKGRD COR BLD-ACNC: 0.02 IU/ML
QUANTIFERON MITOGEN: 10 IU/ML
QUANTIFERON NIL TUBE: 0.01 IU/ML
QUANTIFERON TB1 TUBE: 0.03 IU/ML
QUANTIFERON TB2 TUBE: 0.02

## 2022-07-23 ENCOUNTER — HEALTH MAINTENANCE LETTER (OUTPATIENT)
Age: 27
End: 2022-07-23

## 2022-10-01 ENCOUNTER — HEALTH MAINTENANCE LETTER (OUTPATIENT)
Age: 27
End: 2022-10-01

## 2022-11-10 ENCOUNTER — PRENATAL OFFICE VISIT (OUTPATIENT)
Dept: NURSING | Facility: CLINIC | Age: 27
End: 2022-11-10
Payer: COMMERCIAL

## 2022-11-10 DIAGNOSIS — Z34.00 SUPERVISION OF NORMAL FIRST PREGNANCY: Primary | ICD-10-CM

## 2022-11-10 PROCEDURE — 99207 PR NO CHARGE NURSE ONLY: CPT

## 2022-11-10 RX ORDER — PNV NO.95/FERROUS FUM/FOLIC AC 28MG-0.8MG
TABLET ORAL
COMMUNITY
End: 2023-07-17

## 2022-11-10 NOTE — NURSING NOTE
NPN nurse visit done over the phone. Pt will be given NPN folder and book at her upcoming appt.   Discussed optional screening available to assess chromosomal anomalies. Questions answered. Pt advised to call the clinic if she has any questions or concerns related to her pregnancy. Prenatal labs will be obtained at her upcoming appt. New prenatal visit scheduled on 12/5 with Carol.    7w1d    Last pap 12/21 WNL. Neg HPV        Patient supplied answers from flow sheet for:  Prenatal OB Questionnaire.  Past Medical History  Have you ever recieved care for your mental health? : (!) Yes (depression/anxiety)  Have you ever been in a major accident or suffered serious trauma?: (P) No  Within the last year, has anyone hit, slapped, kicked or otherwise hurt you?: (P) No  In the last year, has anyone forced you to have sex when you didn't want to?: (P) No    Past Medical History 2   Have you ever received a blood transfusion?: (P) No  Would you accept a blood transfusion if was medically recommended?: (P) Yes  Does anyone in your home smoke?: (P) No   Is your blood type Rh negative?: (P) No  Have you ever ?: (P) No  Have you been hospitalized for a nonsurgical reason excluding normal delivery?: (P) No  Have you ever had an abnormal pap smear?: (P) No    Past Medical History (Continued)  Do you have a history of abnormalities of the uterus?: (P) No  Did your mother take AUDRA or any other hormones when she was pregnant with you?: (P) No  Do you have any other problems we have not asked about which you feel may be important to this pregnancy?: (P) No       Krissy MAGANA RN

## 2022-11-27 LAB
ABO/RH(D): NORMAL
ANTIBODY SCREEN: NEGATIVE
SPECIMEN EXPIRATION DATE: NORMAL

## 2022-11-28 ENCOUNTER — ANCILLARY PROCEDURE (OUTPATIENT)
Dept: ULTRASOUND IMAGING | Facility: CLINIC | Age: 27
End: 2022-11-28
Payer: COMMERCIAL

## 2022-11-28 ENCOUNTER — LAB (OUTPATIENT)
Dept: LAB | Facility: CLINIC | Age: 27
End: 2022-11-28
Payer: COMMERCIAL

## 2022-11-28 DIAGNOSIS — Z34.00 SUPERVISION OF NORMAL FIRST PREGNANCY: ICD-10-CM

## 2022-11-28 LAB
ERYTHROCYTE [DISTWIDTH] IN BLOOD BY AUTOMATED COUNT: 12.4 % (ref 10–15)
HBV SURFACE AG SERPL QL IA: NONREACTIVE
HCT VFR BLD AUTO: 32.5 % (ref 35–47)
HCV AB SERPL QL IA: NONREACTIVE
HGB BLD-MCNC: 11.3 G/DL (ref 11.7–15.7)
HIV 1+2 AB+HIV1 P24 AG SERPL QL IA: NONREACTIVE
MCH RBC QN AUTO: 30.3 PG (ref 26.5–33)
MCHC RBC AUTO-ENTMCNC: 34.8 G/DL (ref 31.5–36.5)
MCV RBC AUTO: 87 FL (ref 78–100)
PLATELET # BLD AUTO: 265 10E3/UL (ref 150–450)
RBC # BLD AUTO: 3.73 10E6/UL (ref 3.8–5.2)
T PALLIDUM AB SER QL: NONREACTIVE
WBC # BLD AUTO: 6.7 10E3/UL (ref 4–11)

## 2022-11-28 PROCEDURE — 87340 HEPATITIS B SURFACE AG IA: CPT

## 2022-11-28 PROCEDURE — 76801 OB US < 14 WKS SINGLE FETUS: CPT | Performed by: OBSTETRICS & GYNECOLOGY

## 2022-11-28 PROCEDURE — 86762 RUBELLA ANTIBODY: CPT

## 2022-11-28 PROCEDURE — 86901 BLOOD TYPING SEROLOGIC RH(D): CPT

## 2022-11-28 PROCEDURE — 87086 URINE CULTURE/COLONY COUNT: CPT

## 2022-11-28 PROCEDURE — 86900 BLOOD TYPING SEROLOGIC ABO: CPT

## 2022-11-28 PROCEDURE — 87389 HIV-1 AG W/HIV-1&-2 AB AG IA: CPT

## 2022-11-28 PROCEDURE — 86803 HEPATITIS C AB TEST: CPT

## 2022-11-28 PROCEDURE — 86850 RBC ANTIBODY SCREEN: CPT

## 2022-11-28 PROCEDURE — 36415 COLL VENOUS BLD VENIPUNCTURE: CPT

## 2022-11-28 PROCEDURE — 86780 TREPONEMA PALLIDUM: CPT

## 2022-11-28 PROCEDURE — 85027 COMPLETE CBC AUTOMATED: CPT

## 2022-11-29 LAB
RUBV IGG SERPL QL IA: 9.45 INDEX
RUBV IGG SERPL QL IA: POSITIVE

## 2022-11-30 LAB — BACTERIA UR CULT: NO GROWTH

## 2022-12-05 ENCOUNTER — PRENATAL OFFICE VISIT (OUTPATIENT)
Dept: MIDWIFE SERVICES | Facility: CLINIC | Age: 27
End: 2022-12-05
Payer: COMMERCIAL

## 2022-12-05 VITALS
HEIGHT: 67 IN | SYSTOLIC BLOOD PRESSURE: 102 MMHG | DIASTOLIC BLOOD PRESSURE: 54 MMHG | BODY MASS INDEX: 24.8 KG/M2 | WEIGHT: 158 LBS

## 2022-12-05 DIAGNOSIS — O99.340 DEPRESSION DURING PREGNANCY, ANTEPARTUM: ICD-10-CM

## 2022-12-05 DIAGNOSIS — F32.A DEPRESSION DURING PREGNANCY, ANTEPARTUM: ICD-10-CM

## 2022-12-05 DIAGNOSIS — O21.9 NAUSEA/VOMITING IN PREGNANCY: ICD-10-CM

## 2022-12-05 DIAGNOSIS — Z34.01 ENCOUNTER FOR SUPERVISION OF NORMAL FIRST PREGNANCY IN FIRST TRIMESTER: Primary | ICD-10-CM

## 2022-12-05 DIAGNOSIS — Z11.3 ROUTINE SCREENING FOR STI (SEXUALLY TRANSMITTED INFECTION): ICD-10-CM

## 2022-12-05 PROBLEM — F33.0 MILD RECURRENT MAJOR DEPRESSION (H): Status: ACTIVE | Noted: 2022-12-05

## 2022-12-05 PROCEDURE — 36415 COLL VENOUS BLD VENIPUNCTURE: CPT | Performed by: ADVANCED PRACTICE MIDWIFE

## 2022-12-05 PROCEDURE — 99204 OFFICE O/P NEW MOD 45 MIN: CPT | Performed by: ADVANCED PRACTICE MIDWIFE

## 2022-12-05 PROCEDURE — 87491 CHLMYD TRACH DNA AMP PROBE: CPT | Performed by: ADVANCED PRACTICE MIDWIFE

## 2022-12-05 PROCEDURE — 87591 N.GONORRHOEAE DNA AMP PROB: CPT | Performed by: ADVANCED PRACTICE MIDWIFE

## 2022-12-05 RX ORDER — ONDANSETRON 4 MG/1
4 TABLET, FILM COATED ORAL EVERY 8 HOURS PRN
Qty: 20 TABLET | Refills: 0 | Status: SHIPPED | OUTPATIENT
Start: 2022-12-05 | End: 2022-12-20

## 2022-12-05 NOTE — PATIENT INSTRUCTIONS
Thank you for coming to see the Midwives at the   [facility]!    We will notify you about your labs that were drawn today once we get the results back or if you have [employer] they will be posted there as well    We will call you personally with results that require further discussion    If any referrals were ordered today you should be getting a call in the next week or you may need to call the number listed with your referral to schedule.          If you need any refills of medications please call your pharmacy and they will contact us    If you have any questions or concerns before your next visit, you can reach the clinic at [phone], or send the midwives a message through [employer].    If you  wish to schedule another appointment, please call our office at [phone]. You can also make appointments through [employer]      If you have a medical emergency please call [phone].    Because you are pregnant, we have additional resources for you:    You may call our consulting RN's during normal business hours for non-urgent questions about your pregnancy.    After hours you may reach someone for urgent questions or issues at [phone].  There is always a midwife on call 24 hours a day.    Prenatal Reminders:    Before 14 weeks: Dating ultrasound, Genetic testing       This ultrasound helps us determine your dates accurately. Verifi can be drawn anytime after 10 weeks of gestation  16 weeks: Optional genetic testing (quad screen) or single AFP       This testing helps understand your baby's risk for some genetic abnormalities.  20 weeks:  Screening ultrasound (fetal survey)       This testing will look for early growth abnormalities, and may tell the baby's sex if you wish to find out.  28 weeks: One hour sugar test (GCT), hemoglobin and platelets       This test helps identify diabetes of pregnancy or gestational diabetes.  We also look      at the iron in your blood and how well your blood clots.  28 - 36 weeks:  Tetanus shot (Tdap)       This shot helps protect you and your baby from tetanus and whooping cough.  36 weeks and later: Group B Strep test (GBS)       This test helps predict if you need antibiotics in labor to prevent infection in your baby.  Anytime September to April:  Flu shot       This shot helps protect you and your family from the flu.  This is especially important during pregnancy        Any time during or after your pregnancy you may experience increased depression and/or mood changes.    We are here to support you. Please contact us if you are:  Feeling anxious  Overwhelmed or sad   Trouble sleeping  Crying uncontrollably  Trouble caring for yourself or baby.    The typical schedule after your first visit today you can expect:     Visit 2 - 12-16 weeks  Visit 3 - 20 weeks  Visit 4 - 24 weeks  Visit 5 - 28 weeks  Visit 6 - 32 weeks  Visit 7 - 34 weeks  Visit 8 - 36 weeks  Weekly after 36 weeks until delivery.    If anything comes up between your visits or you have concerns please don't hesitate to contact us.    Secure access to your medical record:  Use [employer] (secure email communication and access to your chart) to send your primary care provider a message or make an appointment. Ask someone on your Team how to sign up for [employer]. To log on to [employer] or for more information in [employer] please visit the website at [email or web address].         Again, thank you for choosing the midwives at [facility].  We are excited to be a part of your pregnancy. Please let us know how we can best partner with you to improve your and your family's health.    Over-the-counter (OTC) medications during pregnancy    Make sure to follow package directions for dosing and information unless otherwise noted on the list.    Morning sickness/nausea:    Unisom (doxylamine) 12.5 mg (1/2 tab) and Vitamin B6 25-50 mg three times daily. Unisom may cause some drowsiness as it is typically used for sleep. The combination of  these medications can be very effective but they can also be taken separately  Dramamine (Dimenhydrinate) 25-50 mg every 4-6 hours as needed  Benadryl (diphenhydramine) 25-50 mg every 4-6 hours   Ginger tablets 1000 mg per day in divided doses    Constipation:    Colace (Docusate sodium)  Metamucil  Citrucel  Milk of magnesia  Fibercon  Miralax (if all other methods have failed)    Diarrhea:  Imodium (loperamide)    Heartburn:    Zantac (ranitidine)  Pepcid (famotidine)  Prilosec (omeprazole)  Antacids-Tums, Maalox (liquid or tablets), Rolaids  Pepto Bismol and Joanne Vici are NOT RECOMMENDED for use during pregnancy because they contain aspirin    Hemorrhoids:    Tucks pads/ointment  Anusol/Anusol-HC  Preparation H    Gas Pain  Simethicone (Gas-X, Mylanta Gas, Mylicon)      Cough, cold, and congestion:    Robitussin (dextromethorphan) and Robitussin DM (dextromethorphan and guaifenesin)  Cough drops/zinc lozenges  Mucinex  Sudafed (after 16 weeks gestation)  Vicks Vapo rub  Cough medicine with alcohol like Nyquil is not recommended during pregnancy    Headache:    Acetaminophen (Tylenol) 650 mg every 4-6 hours or 1000 mg every 6 hours, do not exceed 4000 mg in 24 hours   Ibuprofen (Advil/Motrin) and Naproxen (Aleve) are not recommended during the 1st or 3rd trimester of pregnancy    Allergy:    Benadryl (diphenhydramine)  Zyrtec (cetirizine)  Claritin (loratadine)    Rash/Itching:    Benadryl lotion  Cortaid cream (Hydrocortisone cream)  Benadryl (diphenhydramine)    Vaginal yeast infection:    Monistat 3 or 7 day  Gyne-Lotrimin    Acne:    Benzoyl Peroxide  Salicylic acid     If you have questions or concerns about any medications that are available OTC or you are unsure if something is safe call:    Jericho Denney  [phone]

## 2022-12-05 NOTE — PROGRESS NOTES
Arcelia Andersen is a 27 year old   woman,  who is not a previous CNM patient. She presents for a new OB Visit. This was a planned pregnancy.     FOB is Gadiel who is in good health.  FOB IS actively involved in relationship and this pregnancy.    She has not had bleeding since her LMP.    She denies abdominal pain since her LMP.  She has had nausea.  has had vomiting.  Any personal or family history of blood clots? No  History of sickle cell anemia or trait? No         Patient's last menstrual period was 2022 (exact date)..  Estimated Date of Delivery: 2023 Ultrasound consistent with LMP.    MENSTRUAL HISTORY    frequency: every 28 days  Last PAP:   NIL  History of abnormal Pap?  No      Current medications are:    Current Outpatient Medications:      buPROPion (WELLBUTRIN XL) 150 MG 24 hr tablet, TAKE 1 TABLET BY MOUTH EVERY DAY IN THE MORNING, Disp: 60 tablet, Rfl: 0     doxylamine (UNISOM) 25 MG TABS tablet, Take 25 mg by mouth At Bedtime, Disp: , Rfl:      Prenatal Vit-Fe Fumarate-FA (PRENATAL VITAMIN) 27-0.8 MG TABS, , Disp: , Rfl:      pyridOXINE (VITAMIN B6) 100 MG TABS, Take 25 mg by mouth daily, Disp: , Rfl:      INFECTION HISTORY  HIV: No  Hepatitis B: No  Hepatitis C: No  Tuberculosis: No    Genital Herpes self: no  Herpes partner:  no  Chlamydia:  no  Gonorrhea:  no  HPV: No  BV:  No  Syphilis:  No  Chicken Pox:  No      OB HISTORY  OB History    Para Term  AB Living   1 0 0 0 0 0   SAB IAB Ectopic Multiple Live Births   0 0 0 0 0      # Outcome Date GA Lbr Juan/2nd Weight Sex Delivery Anes PTL Lv   1 Current                  PERSONAL HISTORY  Exercise Habits:  walks  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    Travel plans:  Maybe [place] in March   Diet: eats regular meals  Prenatal vitamins? Yes:  Fish Oil? unsure  Abuse concerns? Not assessed, partner present  Any history if abuse, varbal, physical, sexual? Not assessed, partne  rpresent  Hgb A1c screen:  BMI > 30: No, 1st degree family DM: No, History of GDM: No, PCOS: No, High risk ethnicity: No    Low Dose Aspirin for Preeclampsia Prevention:  Consider for those with 1 high risk or 2  moderate risk factors    High risk: previous pregnancy with preeclampsia, multifetal gestation, chronic htn, diabetes, chronic kidney disease, autoimmune disorder    Medium risk: nulliparity, BMI >30, family hx preeclampsia, age >/= 35,  race, low SES, personal risk factors (hx low birth weight, hx stillbirth, >10yrs between pregnancies).     Patient does not  qualify for low dose aspirin therapy      Social History     Socioeconomic History     Marital status:      Spouse name: Gadiel     Number of children: 0     Years of education: Not on file     Highest education level: Not on file   Occupational History     Occupation: RN   Tobacco Use     Smoking status: Never     Smokeless tobacco: Never   Substance and Sexual Activity     Alcohol use: Not Currently     Comment: Alcoholic Drinks/day: occasionally once a month     Drug use: No     Sexual activity: Yes     Partners: Male   Other Topics Concern     Not on file   Social History Narrative     Not on file     Social Determinants of Health     Financial Resource Strain: Not on file   Food Insecurity: Not on file   Transportation Needs: Not on file   Physical Activity: Not on file   Stress: Not on file   Social Connections: Not on file   Intimate Partner Violence: Not on file   Housing Stability: Not on file         She  reports that she has never smoked. She has never used smokeless tobacco.    STD testing offered?  Accepted  Last PHQ-9 score on record =   PHQ-9 SCORE 6/16/2020   PHQ-9 Total Score 0     Last GAD7 score on record =   ADY-7 SCORE 6/16/2020   Total Score 1     Alcohol Score = 0  Referral/Meds needed? no    PAST MEDICAL/SURGICAL HISTORY  Past Medical History:   Diagnosis Date     Anemia      Depressive disorder       "Urinary tract infection      Varicella      No past surgical history on file.    FAMILY HISTORY  Family History   Problem Relation Age of Onset     No Known Problems Mother      Asthma Father      Depression Father      Kidney Disease Father      Depression Sister      Depression Sister      No Known Problems Brother      Diabetes Maternal Grandfather      Brain Cancer Paternal Grandmother      Depression Paternal Grandfather      Depression Paternal Aunt         several     Hypertension Other         family history of this         ROS:  CONSTITUTIONAL: NEGATIVE for fever, chills, change in weight  INTEGUMENTARU/SKIN: NEGATIVE for worrisome rashes, moles or lesions  EYES: NEGATIVE for vision changes or irritation  ENT: NEGATIVE for ear, mouth and throat problems  RESP: NEGATIVE for significant cough or SOB  BREAST: NEGATIVE for masses, tenderness or discharge  CV: NEGATIVE for chest pain, palpitations or peripheral edema  GI: NEGATIVE for abdominal pain, heartburn, or change in bowel habits. Positive for nausea/vomiting.   : NEGATIVE for unusual urinary or vaginal symptoms. Periods are absent.  MUSCULOSKELETAL: NEGATIVE for significant arthralgias or myalgia  NEURO: NEGATIVE for weakness, dizziness or paresthesias  PSYCHIATRIC: NEGATIVE for changes in mood or affect    PHYSICAL EXAM  Vitals: /54 (BP Location: Right arm, Cuff Size: Adult Regular)   Ht 1.702 m (5' 7\")   Wt 71.7 kg (158 lb)   LMP 09/21/2022 (Exact Date)   Breastfeeding No   BMI 24.75 kg/m    BMI= Body mass index is 24.75 kg/m .     GENERAL:  27 year old pleasant pregnant female, alert, cooperative and well groomed.  NECK:  Thyroid without enlargement and nodules.  Lymph nodes not palpable.   LUNGS:  Clear to auscultation.  BREAST:  Symmetrical without lesions or nodes.  Nipples everted.  Areolas symmetric.  No palpable axillary nodes.  HEART:  RRR without murmur.  ABDOMEN: Soft without masses or tenderness.  No scars noted.. FHTs 160s " auscultated with doppler  GENITALIA: deferred  Chlamydia/gonorrhea self collect   LOWER EXTREMITIES: No edema. No significant varicosities.    ASSESSMENT/PLAN:    IUP at 10w5d     consult for US for AMA patients: NA  Genetic Testing reviewed and discussed, patient desires NIPT to be done today. Handout provided    1. (Z34.01) Encounter for supervision of normal first pregnancy in first trimester  (primary encounter diagnosis)  Plan: [facility] Non-Invasive Prenatal Screening,         NEISSERIA GONORRHOEA PCR, CHLAMYDIA TRACHOMATIS        PCR    2 (O21.9) Nausea/vomiting in pregnancy  Plan: ondansetron (ZOFRAN) 4 MG tablet  - Continue b6/unisom. Requests rx as sometimes this does not improve her nausea, especially while at work. Discussed reglan v zofran, reviewed reglan has a better safety profile in first trimester. She requests zofran.    3. (O99.340,  F32.A) Depression during pregnancy, antepartum  - Stable on Wellbutrin x5 yrs  - Does not see a therapist    4. (Z11.3) Routine screening for STI (sexually transmitted infection)  Plan: NEISSERIA GONORRHOEA PCR, CHLAMYDIA TRACHOMATIS        PCR        COUNSELING    Instructed on use of triage nurse line and contacting the on call CNM after hours in an emergency.     Symptoms of N&V and fatigue usually start to resolve around 12-16 weeks     Reviewed CNM philosophy, call schedule for labor and delivery, and FR for delivery    1st OB handout given outlining appointment spacing and CNM information    Reviewed exercise and nutrition    Recommend to gain 25-35 pounds with her pregnancy.    Discussed OTC medications. OB med list given    Encouraged patient to arrange  if needed    Encouraged patient to take PNV's/DHA    Travel precautions discussed, no air travel after 36 weeks and Zika Virus discussed    Will call patient with lab results when available      F/U to be addressed next visit:  nausea  Will return to the clinic in 4 weeks for her next routine  prenatal check.  Will call to be seen sooner if problems arise.    MARIA M Cardoza, CNM

## 2022-12-06 LAB
C TRACH DNA SPEC QL NAA+PROBE: NEGATIVE
N GONORRHOEA DNA SPEC QL NAA+PROBE: NEGATIVE

## 2022-12-14 LAB — SCANNED LAB RESULT: NORMAL

## 2022-12-20 ENCOUNTER — MYC REFILL (OUTPATIENT)
Dept: MIDWIFE SERVICES | Facility: CLINIC | Age: 27
End: 2022-12-20

## 2022-12-20 ENCOUNTER — MYC REFILL (OUTPATIENT)
Dept: FAMILY MEDICINE | Facility: CLINIC | Age: 27
End: 2022-12-20

## 2022-12-20 DIAGNOSIS — O21.9 NAUSEA/VOMITING IN PREGNANCY: ICD-10-CM

## 2022-12-20 DIAGNOSIS — F32.9 MAJOR DEPRESSIVE DISORDER WITH SINGLE EPISODE, REMISSION STATUS UNSPECIFIED: ICD-10-CM

## 2022-12-20 RX ORDER — ONDANSETRON 4 MG/1
4 TABLET, FILM COATED ORAL EVERY 8 HOURS PRN
Qty: 20 TABLET | Refills: 0 | Status: SHIPPED | OUTPATIENT
Start: 2022-12-20 | End: 2022-12-26

## 2022-12-21 RX ORDER — BUPROPION HYDROCHLORIDE 150 MG/1
150 TABLET ORAL EVERY MORNING
Qty: 90 TABLET | Refills: 0 | OUTPATIENT
Start: 2022-12-21

## 2022-12-21 NOTE — TELEPHONE ENCOUNTER
Left detailed message for pharmacy asking them to send refill to Radha Steward with [facility]. Gave them number to call if they have any questions.     Alexa Andersen CMA.

## 2022-12-21 NOTE — TELEPHONE ENCOUNTER
"Routing refill request to provider for review/approval because:  A break in medication  PHQ 9 score - not on file/out of date.  Patient needs to be seen because it has been more than 2 years since last office visit.    Last Written Prescription Date:  8/16/21  Last Fill Quantity: 60,  # refills: 0   Last office visit provider:  6/16/20     Requested Prescriptions   Pending Prescriptions Disp Refills     buPROPion (WELLBUTRIN XL) 150 MG 24 hr tablet 60 tablet 0     Sig: Take 1 tablet (150 mg) by mouth every morning       SSRIs Protocol Failed - 12/21/2022 11:24 AM        Failed - PHQ-9 score less than 5 in past 6 months     Please review last PHQ-9 score.           Failed - No active pregnancy on record        Failed - Recent (6 mo) or future (30 days) visit within the authorizing provider's specialty     Patient had office visit in the last 6 months or has a visit in the next 30 days with authorizing provider or within the authorizing provider's specialty.  See \"Patient Info\" tab in inbasket, or \"Choose Columns\" in Meds & Orders section of the refill encounter.            Passed - Medication is Bupropion     If the medication is Bupropion (Wellbutrin), and the patient is taking for smoking cessation; OK to refill.          Passed - Medication is active on med list        Passed - Patient is age 18 or older        Passed - No positive pregnancy test in last 12 months             Jarrett Kelly RN 12/21/22 11:25 AM  " Detail Level: Simple Detail Level: Zone Patient Specific Counseling (Will Not Stick From Patient To Patient): LN X 1, charge $0

## 2022-12-21 NOTE — TELEPHONE ENCOUNTER
It appears patient has been seeing Radha Steward at [facility] in [place] for this medication most recently.    Please call pharmacy and let them know that that is who the refill request should be sent to.    I did decline this medication refill already.

## 2022-12-26 DIAGNOSIS — O21.9 NAUSEA AND VOMITING IN PREGNANCY PRIOR TO 22 WEEKS GESTATION: Primary | ICD-10-CM

## 2022-12-26 RX ORDER — ONDANSETRON 4 MG/1
4 TABLET, ORALLY DISINTEGRATING ORAL EVERY 8 HOURS PRN
Qty: 30 TABLET | Refills: 1 | Status: SHIPPED | OUTPATIENT
Start: 2022-12-26 | End: 2023-01-12

## 2023-01-12 ENCOUNTER — PRENATAL OFFICE VISIT (OUTPATIENT)
Dept: MIDWIFE SERVICES | Facility: CLINIC | Age: 28
End: 2023-01-12
Payer: COMMERCIAL

## 2023-01-12 VITALS — WEIGHT: 161 LBS | BODY MASS INDEX: 25.22 KG/M2 | SYSTOLIC BLOOD PRESSURE: 110 MMHG | DIASTOLIC BLOOD PRESSURE: 58 MMHG

## 2023-01-12 DIAGNOSIS — O21.9 NAUSEA AND VOMITING IN PREGNANCY PRIOR TO 22 WEEKS GESTATION: ICD-10-CM

## 2023-01-12 DIAGNOSIS — Z34.02 ENCOUNTER FOR SUPERVISION OF NORMAL FIRST PREGNANCY IN SECOND TRIMESTER: Primary | ICD-10-CM

## 2023-01-12 DIAGNOSIS — F32.9 MAJOR DEPRESSIVE DISORDER WITH SINGLE EPISODE, REMISSION STATUS UNSPECIFIED: ICD-10-CM

## 2023-01-12 PROCEDURE — 99207 PR PRENATAL VISIT: CPT | Performed by: ADVANCED PRACTICE MIDWIFE

## 2023-01-12 RX ORDER — ONDANSETRON 4 MG/1
4 TABLET, ORALLY DISINTEGRATING ORAL EVERY 8 HOURS PRN
Qty: 30 TABLET | Refills: 1 | Status: SHIPPED | OUTPATIENT
Start: 2023-01-12 | End: 2023-03-06

## 2023-01-12 RX ORDER — BUPROPION HYDROCHLORIDE 150 MG/1
150 TABLET ORAL EVERY MORNING
Qty: 90 TABLET | Refills: 3 | Status: SHIPPED | OUTPATIENT
Start: 2023-01-12 | End: 2023-08-14

## 2023-01-12 NOTE — PATIENT INSTRUCTIONS
Weeks 17 thru 20 - Gestational Age (Fetal Age - Weeks 15 thru 18):  The baby has reached a point where movements are being felt more often by the mother. The eyebrows and eyelashes grow in, and tiny nails have begun to grow on the fingers and toes. The skin of the fetus is going through many changes and begins to produce vernix at the twentieth week. Vernix is a white pasty substance that covers the fetus  skin to protect it from amniotic fluid. Your baby can now hear your voices and music.  A fetal heartbeat can be heard by a stethoscope now. The fetus has reached a length of 8 inches and weighs about 12 ounces.

## 2023-01-12 NOTE — PROGRESS NOTES
S:Feels well and has no concerns. Requesting refills of zofran and Wellbutrin.  Fetal movement No  Denies loss of fluid/vb/contractions/pelvic pain  Depression screening done  O:  /58   Wt 73 kg (161 lb)   LMP 09/21/2022 (Exact Date)   BMI 25.22 kg/m    Exam:  Constitutional: healthy, alert and no distress  Respiratory: Respirations even and unlabored  Gastrointestinal: Abdomen soft, non-tender. Fundus measures appropriately for gestational age. Fetal heart tones heard easily.  Psychiatric: mentation appears normal and affect normal/bright  A:    Diagnosis Comments   1. Encounter for supervision of normal first pregnancy in second trimester  US OB > 14 Weeks                   2. Nausea and vomiting in pregnancy prior to 22 weeks gestation  ondansetron (ZOFRAN ODT) 4 MG ODT tab       3. Major depressive disorder with single episode, remission status unspecified  buPROPion (WELLBUTRIN XL) 150 MG 24 hr tablet         P: Discussed options for quad screen today  Patient will consider and let us know  Anatomy ultrasound next visit between 20-22 weeks  Return to clinic 4 weeks    MARIA M Vallejo, JAHAIRA

## 2023-01-12 NOTE — NURSING NOTE
"Chief Complaint   Patient presents with     Prenatal Care     16w 1d refill zofran an bupropion       Initial /58   Wt 73 kg (161 lb)   LMP 2022 (Exact Date)   BMI 25.22 kg/m   Estimated body mass index is 25.22 kg/m  as calculated from the following:    Height as of 22: 1.702 m (5' 7\").    Weight as of this encounter: 73 kg (161 lb).  BP completed using cuff size: regular    Questioned patient about current smoking habits.  Pt. has never smoked.          "

## 2023-02-05 ENCOUNTER — HEALTH MAINTENANCE LETTER (OUTPATIENT)
Age: 28
End: 2023-02-05

## 2023-02-10 ENCOUNTER — ANCILLARY PROCEDURE (OUTPATIENT)
Dept: ULTRASOUND IMAGING | Facility: CLINIC | Age: 28
End: 2023-02-10
Attending: ADVANCED PRACTICE MIDWIFE
Payer: COMMERCIAL

## 2023-02-10 ENCOUNTER — PRENATAL OFFICE VISIT (OUTPATIENT)
Dept: MIDWIFE SERVICES | Facility: CLINIC | Age: 28
End: 2023-02-10
Payer: COMMERCIAL

## 2023-02-10 VITALS — DIASTOLIC BLOOD PRESSURE: 56 MMHG | SYSTOLIC BLOOD PRESSURE: 112 MMHG | WEIGHT: 166.3 LBS | BODY MASS INDEX: 26.05 KG/M2

## 2023-02-10 DIAGNOSIS — Z34.02 ENCOUNTER FOR SUPERVISION OF NORMAL FIRST PREGNANCY IN SECOND TRIMESTER: Primary | ICD-10-CM

## 2023-02-10 DIAGNOSIS — Z34.02 ENCOUNTER FOR SUPERVISION OF NORMAL FIRST PREGNANCY IN SECOND TRIMESTER: ICD-10-CM

## 2023-02-10 DIAGNOSIS — Z36.2 ENCOUNTER FOR FOLLOW-UP ULTRASOUND OF FETAL ANATOMY: Primary | ICD-10-CM

## 2023-02-10 PROCEDURE — 76805 OB US >/= 14 WKS SNGL FETUS: CPT | Performed by: OBSTETRICS & GYNECOLOGY

## 2023-02-10 PROCEDURE — 99207 PR PRENATAL VISIT: CPT | Performed by: ADVANCED PRACTICE MIDWIFE

## 2023-02-10 RX ORDER — POLYETHYLENE GLYCOL 3350 17 G/17G
1 POWDER, FOR SOLUTION ORAL DAILY
COMMUNITY
End: 2023-08-14

## 2023-02-10 NOTE — PROGRESS NOTES
S: Feels well and has no concerns.  Has started feeling fetal movement.  Denies uterine cramping, vaginal bleeding or leaking of fluid  O: Vitals: /56 (BP Location: Right arm, Cuff Size: Adult Regular)   Wt 75.4 kg (166 lb 4.8 oz)   LMP 09/21/2022 (Exact Date)   BMI 26.05 kg/m    BMI= Body mass index is 26.05 kg/m .  Exam:  Constitutional: healthy, alert and no distress  Respiratory: respirations even and unlabored  Gastrointestinal: Abdomen soft, non-tender. Fundus measures appropriate for gestational age. Fetal heart tones hear without difficulty and within normal limits  : Deferred  Psychiatric: mentation appears normal and affect normal/bright  A:    Diagnosis Comments   1. Encounter for supervision of normal first pregnancy in second trimester            P: Discussed 20 week fetal screen.   Encouraged patient to call with any questions or concerns.      MARIA M Vallejo, CNM

## 2023-02-10 NOTE — PATIENT INSTRUCTIONS
Weeks 21 thru 23 - Gestational Age (Fetal Age - Weeks 19 thru 21):  You may be feeling fetal movement every day now. Lanugo now covers the fetus s entire body. The fetus is beginning to have the look of a  infant as the skin becomes less see through and  fat begins to develop. All the parts of the eyes are developed. The liver and pancreas are working hard to develop completely. The fetus has reached about 10-11 inches in length and weighs about 1 - 1   pounds       Understanding Round Ligament Pain in Pregnancy   Round ligament pain is a common problem in pregnancy. Ligaments are strong tissues that connect bones, muscles, and organs. There are 2 round ligaments. There is 1 on each side of the uterus. The top part of each ligament attaches to the upper side of the uterus. The bottom of each ligament attaches down in the pubic area. These ligaments help keep the uterus in place as you movearound.     What causes round ligament pain in pregnancy?   As your uterus grows during pregnancy, the round ligaments are stretched and work harder when you move around. They may stretch too quickly when you stand up or bend or laugh. Nearby nerves may be irritated, or the ligaments may havea painful spasm.   Symptoms of ligament pain in pregnancy  The symptoms are sharp pains that last a few seconds. The pain may happen most often on the right side of the belly. It may happen in the hip, the lowerbelly, or even deep down in your pubic area. The pain may happen when you:     Move suddenly    Stand up    Walk    Roll over in bed    Laugh    Cough    Sneeze  Diagnosing round ligament pain in pregnancy   Your healthcare provider will ask about your symptoms and give you a physical exam. He or she may give you tests to check for other problems that can cause pain, such as an ovarian cyst or enlarged vein (varicocele). He or she will also check for signs of  labor or other pregnancyproblems.   Treatment for round ligament  pain in pregnancy   To help prevent pain:    Move slowly when you stand up, roll over, turn, or bend.    Don t stand for long periods of time.    Don t lift heavy objects.    Do gentle daily stretches of your hip joints.  When to call your healthcare provider  Call your healthcare provider right away if you have any of these:     Fever of 100.4 F (38 C) or higher    Pains that last more than a few minutes    Pain that gets worse    Bleeding, nausea, vomiting, or other new symptoms  [employer] last reviewed this educational content on3/1/2020      3209-5663 [employer]. All rights reserved. This information is not intended as a substitute for professional medical care. Always follow yourhealthcare professional's instructions.            Relieving Back Pain During Pregnancy: Wall Stretch, Body Bend   Before trying these exercises, talk to your healthcare provider to make sure they are safe for you. Ask your healthcare provider how many times to do eachexercise.   Wall stretch  This strengthens and loosens the muscles in your upper back:   1. Lean against a wall with a firm pillow or rolled towel under your shoulder blades. Your feet should be about 12 inches from the wall and shoulder-width apart. Point your chin down.  2. Breathe in. Push your shoulders, neck, and head against the wall. You will feel a stretch in your shoulders.  3. Hold for 5 counts. Then breathe out, and relax your shoulders and neck.   Body bend  This strengthens your back and buttocks muscles:   1. Stand with your legs shoulder-width apart. Put your hands on your upper thighs and bend your knees slightly.  2. Slowly bend forward at the hips. Push your hips back and keep your shoulders up. Make sure your back is straight. You ll feel a stretch in your upper thighs. You ll also feel your back muscles holding you in position.  3. Hold for 5 counts, then straighten.   [employer] last reviewed this educational content on7/1/2021       2120-5585 [employer]. All rights reserved. This information is not intended as a substitute for professional medical care. Always follow yourhealthcare professional's instructions.            Pregnancy: Planning Your Exercise Routine  While you re pregnant, an exercise routine helps both your mind and your body feel good. It tones your muscles and makes them stronger. It also gives you and your babymore oxygen.   The right exercise for you    Overall conditioning is best for you and your baby. Try walking, swimming, or riding a stationary bike. Always warm up, cool down, and drink enough fluids. Keep a snack close by in case your blood sugar gets low. Discuss exercisechoices with your healthcare provider. Talk about the following:     If you already exercise, find out how to adapt your routine while you re pregnant. Keep the intensity of the exercise moderate. As your pregnancy progresses, your center of gravity will change. Be careful to keep your balance.    Ask if there are any local prenatal exercise classes, such as yoga or water aerobics. Find out which prenatal exercise videos are good choices.    If you were not exercising before your pregnancy, find out the best way to start. Now is not the time to begin a new workout on your own. Start slowly. Listen to your body.    Ask which forms of exercise you should avoid. These may include risky activities like hot yoga, horseback riding, scuba diving, skiing, skating, and contact sports.  Pelvic tilts  These help strengthen your stomach muscles and low back. You can do pelvictilts instead of sit-ups.     Do this exercise on your hands and knees.    Relax the back of your neck. Pull your stomach in until your low back flattens.    Hold for 30 seconds. Release. Repeat 10 times. Do this twice a day.  Kegel exercises  Kegel exercises strengthen the pelvic muscles. Doing Kegels daily helps prepare these muscles for delivery. Kegels also help ease your  recovery. You exercise these muscles by tightening, holding, then relaxing them. To do 1 type of Kegel exercise, contract as if you were stopping your urine stream (but do it when you re not urinating). Hold for 10 seconds, then repeat 10 times, a few times a day.   Tips to add activity  Here are some tips to follow:    Park the car farther from a store and walk.    If you can, do errands on foot instead of driving.    Walk across the office to talk to someone in person instead of calling.    While waiting for appointments, go up and down stairs or around the block.  Tips to stay active  Here are some tips to follow:    Maintain your routine. But exercise less intensely if you feel tired.    Base your workout on how you feel, not your heart rate. Heart rates aren t a good way to measure effort during pregnancy.    Don't exercise on your back after week 16.  What are the warning signs that I should stop exercising?  Stop exercising and call your healthcare provider if you have any of thesesymptoms:    Vaginal bleeding     Dizziness or feeling faint     Increased shortness of breath     Chest pain     Headache     Muscle weakness     Calf (back of the leg) pain or swelling      Uterine contractions or  labor     Decreased fetal movement     Fluid leaking (or gushing) from your vagina  [employer] last reviewed this educational content on2021-2022 [employer]. All rights reserved. This information is not intended as a substitute for professional medical care. Always follow yourhealthcare professional's instructions.

## 2023-02-10 NOTE — NURSING NOTE
"Chief Complaint   Patient presents with     Prenatal Care     20w 2d, had an ultrasound prior to appointment today.       Initial /56 (BP Location: Right arm, Cuff Size: Adult Regular)   Wt 75.4 kg (166 lb 4.8 oz)   LMP 2022 (Exact Date)   BMI 26.05 kg/m   Estimated body mass index is 26.05 kg/m  as calculated from the following:    Height as of 22: 1.702 m (5' 7\").    Weight as of this encounter: 75.4 kg (166 lb 4.8 oz).  BP completed using cuff size: regular    Questioned patient about current smoking habits.  Pt. has never smoked.        "

## 2023-03-03 ENCOUNTER — ANCILLARY PROCEDURE (OUTPATIENT)
Dept: ULTRASOUND IMAGING | Facility: CLINIC | Age: 28
End: 2023-03-03
Attending: ADVANCED PRACTICE MIDWIFE
Payer: COMMERCIAL

## 2023-03-03 DIAGNOSIS — Z36.2 ENCOUNTER FOR FOLLOW-UP ULTRASOUND OF FETAL ANATOMY: ICD-10-CM

## 2023-03-03 PROCEDURE — 76816 OB US FOLLOW-UP PER FETUS: CPT | Performed by: OBSTETRICS & GYNECOLOGY

## 2023-03-06 DIAGNOSIS — O21.9 NAUSEA AND VOMITING IN PREGNANCY PRIOR TO 22 WEEKS GESTATION: ICD-10-CM

## 2023-03-06 RX ORDER — ONDANSETRON 4 MG/1
4 TABLET, ORALLY DISINTEGRATING ORAL EVERY 8 HOURS PRN
Qty: 30 TABLET | Refills: 1 | Status: SHIPPED | OUTPATIENT
Start: 2023-03-06 | End: 2023-04-21

## 2023-03-06 NOTE — TELEPHONE ENCOUNTER
ondansetron      Last Written Prescription Date:  1/12/23  Last Fill Quantity: 30,   # refills: 1  Last Office Visit: 2/10/23  Future Office visit:    Next 5 appointments (look out 90 days)    Mar 08, 2023  4:40 PM  ESTABLISHED PRENATAL with Hanny Mendoza CNM  [facility] ([facility] ) [address], [address]  [facility] [id number] [phone]

## 2023-03-16 ENCOUNTER — PRENATAL OFFICE VISIT (OUTPATIENT)
Dept: MIDWIFE SERVICES | Facility: CLINIC | Age: 28
End: 2023-03-16
Payer: COMMERCIAL

## 2023-03-16 VITALS — WEIGHT: 173.7 LBS | BODY MASS INDEX: 27.21 KG/M2 | DIASTOLIC BLOOD PRESSURE: 58 MMHG | SYSTOLIC BLOOD PRESSURE: 102 MMHG

## 2023-03-16 DIAGNOSIS — Z34.02 ENCOUNTER FOR SUPERVISION OF NORMAL FIRST PREGNANCY IN SECOND TRIMESTER: Primary | ICD-10-CM

## 2023-03-16 PROBLEM — D64.9 ANEMIA: Status: ACTIVE | Noted: 2019-11-01

## 2023-03-16 PROBLEM — F33.0 MILD RECURRENT MAJOR DEPRESSION (H): Status: ACTIVE | Noted: 2017-09-07

## 2023-03-16 PROBLEM — F32.9 MAJOR DEPRESSION, SINGLE EPISODE: Status: RESOLVED | Noted: 2019-11-01 | Resolved: 2023-03-16

## 2023-03-16 PROBLEM — F41.1 ANXIETY STATE: Status: ACTIVE | Noted: 2020-12-29

## 2023-03-16 PROBLEM — F32.9 MAJOR DEPRESSION, SINGLE EPISODE: Status: ACTIVE | Noted: 2019-11-01

## 2023-03-16 PROCEDURE — 99207 PR PRENATAL VISIT: CPT

## 2023-03-16 RX ORDER — BREAST PUMP
1 EACH MISCELLANEOUS PRN
Qty: 1 EACH | Refills: 0 | Status: SHIPPED | OUTPATIENT
Start: 2023-03-16 | End: 2023-08-14

## 2023-03-16 NOTE — PROGRESS NOTES
S: Feels good,  Baby active.  Denies uterine cramping, vaginal bleeding or leaking of fluid    Still having nausea no vomiting. Zofran helps with the nausea  Requesting breast pump prescription     O: Vitals: /58 (BP Location: Right arm, Cuff Size: Adult Regular)   Wt 78.8 kg (173 lb 11.2 oz)   LMP 09/21/2022 (Exact Date)   BMI 27.21 kg/m    BMI= Body mass index is 27.21 kg/m .  Exam:  Constitutional: healthy, alert and no distress  Respiratory: respirations even and unlabored  Gastrointestinal: Abdomen soft, non-tender. Fundus measures appropriate for gestational age. Fetal heart tones hear without difficulty and within normal limits  : Deferred  Psychiatric: mentation appears normal and affect normal/bright  A:    Diagnosis Comments   1. Encounter for supervision of normal first pregnancy in second trimester  [facility] [place] [place]) [facility]         P: Discussed GCT/repeat RPR for next visit, handout provided, reminded of longer appointment.  Tdap next visit; reviewed CDC recommendations and partner/family vaccination recommended as well.  Need for Rhogam? No, to be done next visit   Encouraged patient to call with any questions or concerns.  Return to clinic 4 weeks    MARIA M VEGA CNM

## 2023-04-06 ENCOUNTER — PRENATAL OFFICE VISIT (OUTPATIENT)
Dept: MIDWIFE SERVICES | Facility: CLINIC | Age: 28
End: 2023-04-06
Payer: COMMERCIAL

## 2023-04-06 VITALS — SYSTOLIC BLOOD PRESSURE: 102 MMHG | BODY MASS INDEX: 27.57 KG/M2 | DIASTOLIC BLOOD PRESSURE: 64 MMHG | WEIGHT: 176 LBS

## 2023-04-06 DIAGNOSIS — Z34.02 ENCOUNTER FOR SUPERVISION OF NORMAL FIRST PREGNANCY IN SECOND TRIMESTER: Primary | ICD-10-CM

## 2023-04-06 LAB
ERYTHROCYTE [DISTWIDTH] IN BLOOD BY AUTOMATED COUNT: 12.6 % (ref 10–15)
GLUCOSE 1H P 50 G GLC PO SERPL-MCNC: 85 MG/DL (ref 70–129)
HCT VFR BLD AUTO: 34 % (ref 35–47)
HGB BLD-MCNC: 11.2 G/DL (ref 11.7–15.7)
MCH RBC QN AUTO: 29.9 PG (ref 26.5–33)
MCHC RBC AUTO-ENTMCNC: 32.9 G/DL (ref 31.5–36.5)
MCV RBC AUTO: 91 FL (ref 78–100)
PLATELET # BLD AUTO: 206 10E3/UL (ref 150–450)
RBC # BLD AUTO: 3.74 10E6/UL (ref 3.8–5.2)
WBC # BLD AUTO: 8.4 10E3/UL (ref 4–11)

## 2023-04-06 PROCEDURE — 86780 TREPONEMA PALLIDUM: CPT | Performed by: ADVANCED PRACTICE MIDWIFE

## 2023-04-06 PROCEDURE — 82950 GLUCOSE TEST: CPT | Performed by: ADVANCED PRACTICE MIDWIFE

## 2023-04-06 PROCEDURE — 90715 TDAP VACCINE 7 YRS/> IM: CPT | Performed by: ADVANCED PRACTICE MIDWIFE

## 2023-04-06 PROCEDURE — 85027 COMPLETE CBC AUTOMATED: CPT | Performed by: ADVANCED PRACTICE MIDWIFE

## 2023-04-06 PROCEDURE — 90471 IMMUNIZATION ADMIN: CPT | Performed by: ADVANCED PRACTICE MIDWIFE

## 2023-04-06 PROCEDURE — 99207 PR PRENATAL VISIT: CPT | Performed by: ADVANCED PRACTICE MIDWIFE

## 2023-04-06 PROCEDURE — 36415 COLL VENOUS BLD VENIPUNCTURE: CPT | Performed by: ADVANCED PRACTICE MIDWIFE

## 2023-04-06 NOTE — PROGRESS NOTES
S: Feels well and has no concerns.  Baby active.  Denies uterine cramping, vaginal bleeding or leaking of fluid  O: Vitals: /64   Wt 79.8 kg (176 lb)   LMP 09/21/2022 (Exact Date)   BMI 27.57 kg/m    BMI= Body mass index is 27.57 kg/m .  Exam:  Constitutional: healthy, alert and no distress  Respiratory: respirations even and unlabored  Gastrointestinal: Abdomen soft, non-tender. Fundus measures appropriate for gestational age. Fetal heart tones hear without difficulty and within normal limits  : Deferred  Psychiatric: mentation appears normal and affect normal/bright  A:     ICD-10-CM    1. Encounter for supervision of normal first pregnancy in second trimester  Z34.02 Glucose tolerance, gest screen, 1 hour     CBC with platelets     Treponema Abs w Reflex to RPR and Titer        P: GCT/repeat RPR today, handout provided.  Tdap given; reviewed CDC recommendations and partner/family vaccination recommended as well.  Need for Rhogam? No. A+  Discussed patient options for postpartum contraception. Patient given written and verbal information.  Encouraged patient to call with any questions or concerns.  Return to clinic 2 weeks    MARIA M Vallejo, JAHAIRA                           Unknown

## 2023-04-06 NOTE — PATIENT INSTRUCTIONS
Blood Glucose Screening During Pregnancy  Gestational diabetes is diabetes that only pregnant women get. Changes in your body during pregnancy can cause high blood sugar (glucose). This can cause problems for you and your baby. It is a serious condition. But it can be controlled.     Your healthcare provider will talk with you about blood glucose screening.     Who is at risk for gestational diabetes?  You are at risk of getting gestational diabetes if any of the risk factors below apply to you. The risk for this condition gets higher as your number of risk factors increases:    You are , , , , or .    You weigh more than your healthcare provider says is healthy for you.    You have a relative with diabetes.    You are older than 25.    You had gestational diabetes during a past pregnancy.    You had a stillbirth or a very large baby before.    You have a history of abnormal glucose tolerance.    You have sugar in your urine at the first prenatal visit.    You have metabolic syndrome, PCOS (polycystic ovary syndrome), are using glucocorticoids, or have high blood pressure.    You are pregnant with twins or more  What happens during a screening?  Here is what to expect during a blood glucose screening:    There is conflicting advice for screening. But the American College of Obstetricians and Gynecologists currently advises that all pregnant women be screened for gestational diabetes. When you are screened depends on your risk. Women are tested at 24 to 28 weeks of pregnancy. Women at high risk may be tested when they first learn they are pregnant.    To do the screening, a blood sample is taken. Your blood sugar level is measured.    If the results show a high blood sugar level, a glucose tolerance test may be ordered. You will drink a certain amount of sugar. This test measures how long it takes for sugar to leave your blood. The test will show if you  have gestational diabetes.  What to know if you test positive  Here are some things you need to know:    Gestational diabetes can be treated. The best way to control it is to find out you have it early and start treatment quickly.    This condition can cause problems for the mother during pregnancy. It can also cause problems with the baby during pregnancy, delivery, and after. Treatment greatly lowers the chance for problems.    The changes in your body that cause gestational diabetes normally happen only when you are pregnant. After the baby is born, your body goes back to normal. The condition goes away. But you may be more likely to have type 2 diabetes later. Talk with your healthcare provider about ways to help prevent type 2 diabetes.  Treating gestational diabetes  Here is how to treat gestational diabetes:    You ll need to check your blood sugar often. You can do this at home. Prick your finger and check a drop of blood on a glucose monitor. Your healthcare provider will show you how and when to check your blood sugar. They will talk about your target blood sugar level.    To manage your blood sugar, you will be given a special plan. It will likely include meal planning and getting regular exercise. Some women need to take a hormone called insulin. Others may take medicine to help control their blood sugar.  [employer] last reviewed this educational content on 8/1/2020 2000-2022 [employer]. All rights reserved. This information is not intended as a substitute for professional medical care. Always follow your healthcare professional's instructions.          Tdap (Tetanus, Diphtheria, Pertussis) Vaccine: What You Need to Know    This is a Vaccine Information Statement from the CDC.   Many vaccine information statements are available in French and other languages. See www.immunize.org/vis   Hojas de información sobre vacunas están disponibles en español y en muchos otros idiomas. Visite  www.immunize.org/vis   1. Why get vaccinated?   Tdap vaccine can prevent tetanus, diphtheria, and pertussis.   Diphtheria and pertussis spread from person to person. Tetanus enters the body through cuts or wounds.     TETANUS (T) causes painful stiffening of the muscles. Tetanus can lead to serious health problems, including being unable to open the mouth, having trouble swallowing and breathing, or death.    DIPHTHERIA (D) can lead to difficulty breathing, heart failure, paralysis, or death.    PERTUSSIS (aP), also known as  whooping cough,  can cause uncontrollable, violent coughing that makes it hard to breathe, eat, or drink. Pertussis can be extremely serious especially in babies and young children, causing pneumonia, convulsions, brain damage, or death. In teens and adults, it can cause weight loss, loss of bladder control, passing out, and rib fractures from severe coughing.  2. Tdap vaccine   Tdap is only for children 7 years and older, adolescents, and adults.   Adolescents should receive a single dose of Tdap, preferably at age 11 or 12 years.   Pregnant people should get a dose of Tdap during every pregnancy, preferably during the early part of the third trimester, to help protect the  from pertussis. Infants are most at risk for severe, life-threatening complications from pertussis.   Adults who have never received Tdap should get a dose of Tdap.   Also, adults should receive a booster dose of either Tdap or Td (a different vaccine that protects against tetanus and diphtheria but not pertussis) every 10 years , or after 5 years in the case of a severe or dirty wound or burn.   Tdap may be given at the same time as other vaccines.   3. Talk with your health care provider  Tell your vaccination provider if the person getting the vaccine:     Has had an allergic reaction after a previous dose of any vaccine that protects against tetanus, diphtheria, or pertussis , or has any severe, life-threatening  allergies    Has had a coma, decreased level of consciousness, or prolonged seizures within 7 days after a previous dose of any pertussis vaccine (DTP, DTaP, or Tdap)    Has seizures or another nervous system problem    Has ever had Guillain-Barré Syndrome (also called  GBS )    Has had severe pain or swelling after a previous dose of any vaccine that protects against tetanus or diphtheria  In some cases, your health care provider may decide to postpone Tdap vaccination until a future visit.   People with minor illnesses, such as a cold, may be vaccinated. People who are moderately or severely ill should usually wait until they recover before getting Tdap vaccine.   Your health care provider can give you more information.  4. Risks of a vaccine reaction     Pain, redness, or swelling where the shot was given, mild fever, headache, feeling tired, and nausea, vomiting, diarrhea, or stomachache sometimes happen after Tdap vaccination.  People sometimes faint after medical procedures, including vaccination. Tell your provider if you feel dizzy or have vision changes or ringing in the ears.   As with any medicine, there is a very remote chance of a vaccine causing a severe allergic reaction, other serious injury, or death.   5. What if there is a serious problem?  An allergic reaction could occur after the vaccinated person leaves the clinic. If you see signs of a severe allergic reaction (hives, swelling of the face and throat, difficulty breathing, a fast heartbeat, dizziness, or weakness), call 9-1-1 and get the person to the nearest hospital.   For other signs that concern you, call your health care provider.   Adverse reactions should be reported to the Vaccine Adverse Event Reporting System (VAERS). Your health care provider will usually file this report, or you can do it yourself. Visit the VAERS website at www.vaers.hhs.gov or call 1-998.909.1355. VAERS is only for reporting reactions, and VAERS staff members do  not give medical advice.   6. The National Vaccine Injury Compensation Program  The National Vaccine Injury Compensation Program (VICP) is a federal program that was created to compensate people who may have been injured by certain vaccines. Claims regarding alleged injury or death due to vaccination have a time limit for filing, which may be as short as two years. Visit the VICP website at www.hrsa.gov/vaccinecompensation or call 1-665.438.2473 to learn about the program and about filing a claim.   7. How can I learn more?     Ask your health care provider.    Call your local or state health department.    Visit the website of the Food and Drug Administration (FDA) for vaccine package inserts and additional information at www.fda.gov/vaccines-blood-biologics/vaccines.  ? Contact the Centers for Disease Control and Prevention (CDC): Call 1-145.644.2798 ( 6-146-HWE-INFO) or  ? Visit CDC s website at www.cdc.gov/vaccines.  Vaccine Information Statement   Tdap (Tetanus, Diphtheria, Pertussis) Vaccine  42 U.S.C.   [id number]  8/6/2021  [employer] last reviewed this educational content on     0717-9326 [employer]. All rights reserved. This information is not intended as a substitute for professional medical care. Always follow your healthcare professional's instructions.        You have been provided the My Labor and Birth Wishes document.  Please review at home and bring to your next prenatal visit. Bring this sheet to the hospital for your birth. Give copies to your care team members and support person.   Additional copies can be found here:  [email or web address]

## 2023-04-06 NOTE — NURSING NOTE
"Chief Complaint   Patient presents with     Prenatal Care            Initial /64   Wt 79.8 kg (176 lb)   LMP 2022 (Exact Date)   BMI 27.57 kg/m   Estimated body mass index is 27.57 kg/m  as calculated from the following:    Height as of 22: 1.702 m (5' 7\").    Weight as of this encounter: 79.8 kg (176 lb).  BP completed using cuff size: regular    Questioned patient about current smoking habits.  Pt. has never smoked.          Colleen Vanessa, [facility]          "

## 2023-04-07 LAB — T PALLIDUM AB SER QL: NONREACTIVE

## 2023-04-17 RX ORDER — CALCIUM CARBONATE 500 MG/1
TABLET, CHEWABLE ORAL
COMMUNITY
Start: 2023-03-31 | End: 2023-07-17

## 2023-04-21 ENCOUNTER — PRENATAL OFFICE VISIT (OUTPATIENT)
Dept: MIDWIFE SERVICES | Facility: CLINIC | Age: 28
End: 2023-04-21
Payer: COMMERCIAL

## 2023-04-21 VITALS — BODY MASS INDEX: 28.35 KG/M2 | DIASTOLIC BLOOD PRESSURE: 52 MMHG | WEIGHT: 181 LBS | SYSTOLIC BLOOD PRESSURE: 100 MMHG

## 2023-04-21 DIAGNOSIS — Z34.93 PRENATAL CARE IN THIRD TRIMESTER: Primary | ICD-10-CM

## 2023-04-21 DIAGNOSIS — O21.9 NAUSEA AND VOMITING IN PREGNANCY: ICD-10-CM

## 2023-04-21 PROCEDURE — 99207 PR PRENATAL VISIT: CPT | Performed by: ADVANCED PRACTICE MIDWIFE

## 2023-04-21 RX ORDER — ONDANSETRON 4 MG/1
4 TABLET, ORALLY DISINTEGRATING ORAL EVERY 8 HOURS PRN
Qty: 30 TABLET | Refills: 4 | Status: SHIPPED | OUTPATIENT
Start: 2023-04-21 | End: 2023-07-17

## 2023-04-21 NOTE — NURSING NOTE
"Chief Complaint   Patient presents with     Prenatal Care     30w 2d        Initial /52   Wt 82.1 kg (181 lb)   LMP 2022 (Exact Date)   BMI 28.35 kg/m   Estimated body mass index is 28.35 kg/m  as calculated from the following:    Height as of 22: 1.702 m (5' 7\").    Weight as of this encounter: 82.1 kg (181 lb).  BP completed using cuff size: regular    Questioned patient about current smoking habits.  Pt. has never smoked.          "

## 2023-04-21 NOTE — PROGRESS NOTES
Feeling well.  Baby is active. Denies any leaking of fluid, vaginal bleeding, regular uterine contractions, or headaches or other concerns.  She request more Zofran.  She thinks switching from day to night shift affects the nausea.  More ordered.  We reviewed fetal kick counts.    Reviewed to call for contractions, loss of fluid, vaginal bleeding, decreased fetal movement or any other questions or concerns.    RTC in 2 weeks.  Emily Carbajal, FRED, APRN, CNM               
5'6''  168lbs

## 2023-05-02 ENCOUNTER — PRENATAL OFFICE VISIT (OUTPATIENT)
Dept: MIDWIFE SERVICES | Facility: CLINIC | Age: 28
End: 2023-05-02
Payer: COMMERCIAL

## 2023-05-02 VITALS — WEIGHT: 180 LBS | BODY MASS INDEX: 28.19 KG/M2 | SYSTOLIC BLOOD PRESSURE: 96 MMHG | DIASTOLIC BLOOD PRESSURE: 64 MMHG

## 2023-05-02 DIAGNOSIS — Z34.03 ENCOUNTER FOR SUPERVISION OF NORMAL FIRST PREGNANCY IN THIRD TRIMESTER: Primary | ICD-10-CM

## 2023-05-02 DIAGNOSIS — Z34.01 ENCOUNTER FOR SUPERVISION OF NORMAL FIRST PREGNANCY IN FIRST TRIMESTER: ICD-10-CM

## 2023-05-02 PROCEDURE — 99207 PR PRENATAL VISIT: CPT | Performed by: REGISTERED NURSE

## 2023-05-02 NOTE — PATIENT INSTRUCTIONS
Understanding  Labor  Going into labor before week 37 of pregnancy is called  labor.  labor can cause your baby to be born too soon. This can lead to health problems for your baby.     Before labor, the cervix is thick and closed.      In  labor, the cervix begins to efface (thin) and dilate (open).     Symptoms of  labor  If you think you re having  labor, get medical help right away. Contractions alone don t mean you re in  labor. What matters more are changes in your cervix. The cervix is the opening at the lower end of the uterus. Symptoms of  labor include:    4 or more contractions per hour    Strong contractions    Constant menstrual-like cramping    Low-back pain    Mucous or bloody fluid from the vagina    Bleeding or spotting in the second or third trimester  Evaluating  labor  Your healthcare provider will try to find out if you re in  labor or just having contractions. They may watch you for a few hours. You may have these tests:    Pelvic exam. This is to see if your cervix has effaced (thinned) and dilated (opened).    Uterine activity monitoring. This is used to detect contractions.    Fetal monitoring. This is done to check the health of your baby.    Ultrasound. This test looks at your baby s size and position.    Amniocentesis. This test checks how mature your baby s lungs are.  Caring for yourself at home  If you have  contractions, but your cervix is still thick and closed, your healthcare provider may tell you to:    Drink plenty of water.    Do fewer activities.    Rest in bed on your side.    Don't have intercourse or stimulate your nipples.  When to call your healthcare provider  Call your healthcare provider if you have any of these:    4 or more contractions per hour    Bag of water breaks    Bleeding or spotting  If you need hospital care   labor often means that you need hospital care. You may need  complete bed rest. You may have an IV (intravenous) line in your arm or hand. This is to give you fluids. You may be given pills or injections. These are done to help prevent contractions. You may get a medicine called a corticosteroid. This is to help your baby s lungs mature more quickly.  Are you at risk?  Any pregnant woman can have  labor. It may start for no reason. But these risk factors can increase your chances:    Past  labor or early birth    Smoking, drug, or alcohol use in pregnancy    A multiple pregnancy (twins or more)    Problems with the shape of the uterus    Bleeding during the pregnancy  The dangers of  birth  A baby born too soon may have health problems. This is because the baby didn t have enough time to grow. Some of the risks for your baby include:    Not breastfeeding or feeding well    Having immature lungs    Bleeding in the brain    Death  Reaching term  Your goal is to get as close to term (week 37 or later) as you can before giving birth. The closer you get to term, the higher your chance of having a healthy baby. Work with your healthcare provider. Together, you can take steps that may keep you from giving birth too early.  [employer] last reviewed this educational content on 10/1/2021    8195-7103 [employer]. All rights reserved. This information is not intended as a substitute for professional medical care. Always follow your healthcare professional's instructions.          Adapting to Pregnancy: Third Trimester  Although common during pregnancy, some discomforts may seem worse in the final weeks. Simple lifestyle changes can help. Take care of yourself. And ask your partner to help out with small tasks.   Limiting leg problems  Ways to combat leg issues:    Wear support hose all day.    Don't wear snug shoes or clothes that bind, such as tight pants and socks with elastic tops.    Sit with your feet and legs raised often.  Caring for your  breasts  Tips to follow include:    Wash with plain water. Don't use harsh soaps or rubbing alcohol. They may cause dryness.    Wear a nursing bra for extra support. It can also hide any leaks from your nipples.  Controlling hemorrhoids  Ways to prevent hemorrhoids include:     Eat foods that are high in fiber. Also exercise and drink enough fluids. This will reduce constipation and hemorrhoids.    Sleep and nap on your side. This limits pressure on the veins of your rectum.    Try not to stand or sit for long periods.  Controlling back pain  As your body changes during pregnancy, your back must work in new ways. Back pain has many causes. Physical changes in your body can strain your back and its supporting muscles. Also hormones increase during pregnancy. This can affect how your muscles and joints work together. All of these changes can lead to pain.   Pain may be felt in the upper or lower back. Pain is also common in the pelvis. Some pregnant women have sciatica. This is pain caused by pressure on the sciatic nerve running down the back of the leg. Ice or heat may help. Your provider may advise massage therapy or a chiropractor. Sleep on your left side with a pillow between your knees. Use a brace or support device. Ask your provider for specific tips and exercises to help control your back pain.   Tips to help you rest  Good rest and sleep will help you feel better. Here are some ideas:     Ask your partner to massage your shoulders, neck, or back.    Limit the errands you do each day.    Lie down in the afternoon or after work for a few minutes.    Take a warm bath before you go to sleep.    Drink warm milk or teas without caffeine.    Don't drink coffee, black tea, and cola.  Stopping heartburn    Don't eat spicy, greasy, fried, or acidic foods.    Eat small amounts more often. Eat slowly.   Wait 2 hours after eating before lying down.    Sleep with your upper body raised 6 inches.   Managing mood  swings  Ways to manage mood swings include:     Know that mood changes are normal.    Exercise often, but get plenty of rest.    Address any concerns and limit stress. Talking to your partner, other women, or your healthcare provider may help.   Dealing with urinary frequency  Tips to deal with having to urinate often include:     Drink plenty of water all day. But if you drink a lot in the evening, you may have to get up more in the night.    Limit coffee, black tea, and cola.  How daily issues affect your health  Many things in your daily life impact your health. This can include transportation, money problems, housing, access to food, and . If you can t get to medical appointments, you may not receive the care you need. When money is tight, it may be difficult to pay for medicines. And living far from a grocery store can make it hard to buy healthy food.   If you have concerns in any of these or other areas, talk with your healthcare team. They may know of local resources to assist you. Or they may have a staff person who can help.   [employer] last reviewed this educational content on 7/1/2021 2000-2022 [employer]. All rights reserved. This information is not intended as a substitute for professional medical care. Always follow your healthcare professional's instructions.        You have been provided the Any Day Now: Early Labor at Home document.    Additional copies can be found here:  [email or web address]  You have been provided the What I'd Wish I'd Known About Giving Birth document.    Additional copies can be found here:  [email or web address]

## 2023-05-02 NOTE — PROGRESS NOTES
S: Feels good overall,  Baby active.  Denies uterine cramping, vaginal bleeding or leaking of fluid. Does still have some nausea worsened with shift changes, takes zofran and helps. Has questions about IOLs.     O: Vitals: BP 96/64   Wt 81.6 kg (180 lb)   LMP 09/21/2022 (Exact Date)   BMI 28.19 kg/m    BMI= Body mass index is 28.19 kg/m .  Exam:  Constitutional: healthy, alert and no distress  Respiratory: respirations even and unlabored  Gastrointestinal: Abdomen soft, non-tender. Fundus measures appropriate for gestational age. Fetal heart tones hear without difficulty and within normal limits  : Deferred  Psychiatric: mentation appears normal and affect normal/bright  A:     ICD-10-CM    1. Encounter for supervision of normal first pregnancy in third trimester  Z34.03         P: Discussed plans for labor. Filled out birth wishes. Planning to use pain interventions as needed, would like to avoid epidural. Discussed patient options for postpartum contraception. Patient is planning condoms   Reviewed IOL policies  Reviewed warning signs.   Encouraged patient to call with any questions or concerns.  Return to clinic 2 weeks    MARIA M Adams CNM

## 2023-05-16 ENCOUNTER — PRENATAL OFFICE VISIT (OUTPATIENT)
Dept: MIDWIFE SERVICES | Facility: CLINIC | Age: 28
End: 2023-05-16
Payer: COMMERCIAL

## 2023-05-16 VITALS — DIASTOLIC BLOOD PRESSURE: 60 MMHG | BODY MASS INDEX: 29.13 KG/M2 | WEIGHT: 186 LBS | SYSTOLIC BLOOD PRESSURE: 106 MMHG

## 2023-05-16 DIAGNOSIS — Z34.03 ENCOUNTER FOR SUPERVISION OF NORMAL FIRST PREGNANCY IN THIRD TRIMESTER: Primary | ICD-10-CM

## 2023-05-16 PROCEDURE — 99207 PR PRENATAL VISIT: CPT | Performed by: ADVANCED PRACTICE MIDWIFE

## 2023-05-16 NOTE — NURSING NOTE
"Chief Complaint   Patient presents with     Prenatal Care     33w 6d no concerns       Initial /60   Wt 84.4 kg (186 lb)   LMP 2022 (Exact Date)   BMI 29.13 kg/m   Estimated body mass index is 29.13 kg/m  as calculated from the following:    Height as of 22: 1.702 m (5' 7\").    Weight as of this encounter: 84.4 kg (186 lb).  BP completed using cuff size: regular    Questioned patient about current smoking habits.  Pt. has never smoked.          "

## 2023-05-16 NOTE — PATIENT INSTRUCTIONS
Weeks 37 thru 40 - Gestational Age (Fetal Age - Weeks 35 thru 38):  At 38 weeks the fetus is considered full term and is ready to make its appearance at any time. As your baby becomes bigger, you may notice a change in fetal movement. If you notice a decrease in fetal movement, make sure to talk with your doctor. The fingernails have grown long and will need to be cut soon after birth. Small breast buds are present on both sexes. The mother is supplying the fetus with antibodies that will help protect against disease. All organs are developed, with the lungs maturing all the way until the day of delivery. The fetus is about 19 - 21 inches in length and weighs anywhere from 6   lbs to 10 lbs.     The Benefits of Breastmilk  Breastmilk is the best food for your baby. It has just the right amount of nutrients. It protects your baby's digestive system. It protects other body systems in your baby. And it helps them grow and develop.       Healthiest for baby  Breastmilk is the ideal food for babies. It has all the nutrients your baby needs to grow healthy and strong.    Breastmilk has these benefits:  It lowers the risk for sudden infant death syndrome (SIDS).  It gives babies a lower risk for ear infections in their first year. This is compared to babies who are fed formula.  It has DHA. This is a type of fat. It helps your baby s growing brain, nervous system, and eyes.  It is full of antibodies. These help your baby fight infection.  It lowers your baby's risk for lung illness. It lowers their risk of diarrhea.  It lowers your baby s risk for allergies. Babies fed formula are more likely to have an allergy to cow's milk.  It lowers your baby s risk for colds and many other diseases.  It changes as your baby grows. This meets your baby's changing needs.  And it s important to know that:  Giving only breastmilk for the first 6 months gives your baby more of these benefits.  Giving breastmilk plus solid food from 6  months to 1 year or more gives more benefits.   babies have fewer long-term health problems when they grow up. These problems include diabetes and obesity.  Breastfeeding gives contact that your baby loves. Spending time skin-to-skin with you is calming and comforting.  Healthiest for mom  For many people, breastfeeding is a good experience. It creates a strong bond between mother and baby. People who breastfeed also get health benefits. Some benefits for you include:   You can know that your baby is growing healthy and strong because of your milk.  Breastmilk is convenient. It's free and clean. It's always at the right temperature.  Breastfeeding burns calories. This can help you lose pregnancy weight faster.  Breastfeeding releases hormones that contract the uterus. This helps the uterus return to its normal size after childbirth.  Mothers who breastfeed have a lower risk for ovarian and breast cancers.  Some studies have found that breastfeeding may reduce a person's risk for type 2 diabetes and rheumatoid arthritis. It may reduce the risk of cardiovascular disease. This includes high blood pressure and high cholesterol.  Breastfeeding every day delays the return of your menstrual period. This can help extend the time between pregnancies.  Many people can help you learn to breastfeed. A lactation consultant can help. This is a healthcare provider who is trained to help you breastfeed. Your nurse, midwife, nurse practitioner, obstetrician, pediatrician, or family practice doctor can also help you learn about breastfeeding.   What does it mean to give only breastmilk?   Giving only breastmilk for at least the first 6 months of life is best for your baby. Feeding your baby from your breasts is best. If you need to be away from your baby, you can express breastmilk. This means pumping milk from your breast into a container. Talk with your healthcare provider about the best ways to feed this milk to your  baby.    You should not give your baby water, sugar water, formula, or solids during their first 6 months unless your baby's healthcare provider tells you to.   Your baby s provider may tell you to give your baby vitamins, minerals, or medicines.  babies should be given vitamin D supplements. The provider will tell you the type and amount of vitamin D to give your baby.   What are the risks of not giving only breastmilk?   You now know the many of the benefits of breastfeeding. But you might not know why it's important to give only breastmilk for at least 6 months.   Your baby gets the best protection against health problems when they get only breastmilk. Breastfeeding some of the time is good. But breastfeeding all of the time is best.   Giving your baby formula or other liquids may cause you to:  Have more problems breastfeeding  Make less milk  Be less confident in breastfeeding  Breastfeed less often  Stop breastfeeding before your baby is at least 12 months old                                                     When other options may be needed  Giving only breastmilk is almost always the best thing to do. But your healthcare provider may have reasons to advise giving your baby formula or other liquids. They include:   Your baby has a health problem. There are cases where you may need to add formula or other liquids. This is often only for a short time. This may be the case if your baby has low blood sugar (hypoglycemia), loses body fluids (dehydration), or has high levels of bilirubin.  You have certain health problems. Some infections can be passed from your skin to your baby's skin. Or it can pass through your breastmilk. People with HIV/AIDS or untreated and contagious TB (tuberculosis) should not breastfeed. Women with active skin sores from chickenpox (varicella) can pump their breastmilk and feed their baby. But they should keep their baby s skin from touching any of the sores.  You use illegal  drugs or drink alcohol. People who use illegal drugs should not breastfeed. If you are going to have a drink that has alcohol, it's best to do so just after you nurse or pump milk. Breastfeeding or pumping breast milk is OK at least 4 hours after your last drink. That way, your body will have some time to get rid of the alcohol before the next feeding. Less of it will reach your baby. Long-term exposure to alcohol in breastmilk may affect your baby's health. It may also cause you to make less milk.  You take certain medicines. If you take any medicines, ask your baby s healthcare provider if you can breastfeed.  [employer] last reviewed this educational content on 2020-2022 [employer]. All rights reserved. This information is not intended as a substitute for professional medical care. Always follow your healthcare professional's instructions.          What Is Group B Strep?  Group B strep (streptococcus) is a common type of bacteria. It can grow in a woman s vagina, rectum, or urinary tract. It most often does not cause harm in adults. But in rare cases, a woman who has group B strep can infect her baby during the birth. This can cause serious illness in the . But treating the mother during labor reduces the risk of the baby becoming infected. And if a  gets group B strep, the infection can be treated.     Facts about group B strep   Learning more about group B strep can help you understand how testing and treatment can help. Here are some basic facts about group B strep:   It is not a sexually transmitted disease.  It is not the same as strep throat. (That is caused by group A strep.)  It often has no symptoms. It may cause no problems in adults.  Test results can be misleading. They may be negative one week and positive the next week.  Group B strep can be spread to the baby during vaginal delivery. It cannot be passed during  (surgical) birth.  A mother with group B  strep rarely infects her . (Infection happens only about 1% to 2% of the time.)  When a mother is treated during labor and delivery, her baby almost never becomes infected.  Certain factors during pregnancy increase the risk of a baby becoming infected.  Possible effects on your baby   Group B strep can infect the blood. It can also cause inflammation of the baby s lungs, brain, or spinal cord. Long-term effects can include blindness, deafness, mental retardation, or cerebral palsy. And in rare cases, infection causes death. Infection is most often found soon after the baby is born.   How your baby may become infected   Group B strep often lives in the vagina or rectum. If the amniotic sac breaks early, bacteria from the vagina can travel to the uterus, reaching the baby. Or, as the baby passes through the birth canal, it can come in contact with the bacteria. In rare cases, group B strep can be passed to the baby after delivery. This is called late-onset group B strep. The source of this type of infection is not well understood. But some experts believe that it happens if the baby is exposed to group B strep in the home, from the parents or siblings, or in the community.   What increases the risk?   Certain risk factors increase the chance that a baby will be infected. They include:   Breaking or leaking of the amniotic sac before 37 weeks of pregnancy  Labor before 37 weeks of pregnancy  Breaking of the amniotic sac more than 18 hours before labor starts  Fever during labor  A urinary tract infection with group B strep at any point in the pregnancy  Having a previous baby born with a group B strep infection  [employer] last reviewed this educational content on 2020-2022 [employer]. All rights reserved. This information is not intended as a substitute for professional medical care. Always follow your healthcare professional's instructions.          Vitamin K Deficiency Bleeding  (VKDB) in a  Baby  What is vitamin K deficiency bleeding in a ?  Vitamin K deficiency bleeding (VKDB) is a problem that occurs in some  babies. It most often happens during the first few days and weeks of life. But it can occur up to 6 months of age. This condition used to be called hemorrhagic disease of the .    What causes vitamin K deficiency bleeding in a ?  Babies are normally born with low levels of vitamin K. Vitamin K is needed for blood to clot. Not having enough vitamin K is the main cause of vitamin deficiency bleeding. If your baby s blood doesn t clot, they may have severe bleeding or a hemorrhage. This can be life-threatening. The cause of vitamin K deficiency depends on the 3 types of VKDB:   Early VKDB. This can occur right after birth or up to 24 hours of age. It's caused by certain medicines the mother took during pregnancy  Classical VKDB. This occurs from 1 to 7 days after birth. It's caused by low levels of vitamin K found in newborns.  Late VKDB. This most often occurs up to 3 months after birth. But it can occur up to 6 months after birth. It can occur in a baby who did not get a vitamin K shot at birth and who was  only.    Which newborns are at risk for vitamin K deficiency bleeding?   These things may make it more likely for a baby to have this condition:   Not getting a vitamin K shot at birth.The American Academy of Pediatrics recommends that all newborns get a vitamin K shot. This can prevent severe bleeding.   Being  only and not getting a vitamin K shot at birth.  Breastmilk has less vitamin K than formula made with cow s milk. The vitamin K shot will provide what a  baby needs. A mother who takes a vitamin K supplement while breastfeeding will not raise her baby's vitamin K level.  Being born to a mother who took certain medicines during pregnancy.  These include medicines for seizures (anticonvulsants) and medicines for  blood-clotting problems (anticoagulants).  What are the symptoms of vitamin K deficiency bleeding in a ?   Symptoms can occur a bit differently in each child. They can include:   Blood in your baby's stool that make it black and sticky (tarry)  Blood in your baby's urine  Oozing of blood from around your baby s umbilical cord or circumcision site  Bruising more easily than normal. This may happen around your baby's head and face.  Beingvery sleepy or fussy. In severe cases, vitamin K deficiency may cause bleeding in and around the brain. Other signs of bleeding in the brain can include seizures or vomiting, not just spitting up.  The symptoms of this condition may be similar to symptoms of other health issues. Make sure your child sees a healthcare provider for a diagnosis.   How is vitamin K deficiency bleeding in a  diagnosed?   The healthcare provider will look at your baby's health history. They will also check your baby for signs of bleeding. Your baby may need lab tests to measure their blood clotting times. The results of these tests can help your child s healthcare provider make the diagnosis.   How is vitamin K deficiency bleeding in a  treated?   Treatment will depend on your child s symptoms, age, and general health. It will also depend on how severe the condition is.   Your baby will probably get a vitamin K shot.   Your baby may need a blood transfusion if they have severe bleeding. If your baby is severely ill, they may need to be treated in the intensive care unit (ICU).   What are possible complications of vitamin K deficiency bleeding in a ?   Vitamin K deficiency bleeding can lead to life-threatening problems. These include dangerous bleeding that can lead to brain damage or death. In the [place] deaths and long-term complications from vitamin K deficiency have been greatly lowered because of vitamin K shots given at birth. But bleeding into the brain, central nervous  system, stomach, intestines, or other parts of the body can cause serious problems, or even death.   Can vitamin K deficiency bleeding in a  be prevented?   This condition can be prevented. The American Academy of Pediatrics recommends that all newborns get a vitamin K shot. Your child will get a shot into their upper leg (thigh) muscle. This shot will be given soon after birth. This will prevent dangerous bleeding.   Key points about vitamin K deficiency bleeding in a    Vitamin K deficiency bleeding is a problem that occurs in some newborns. It often happens during the first few days of life.  Babies are normally born with low levels of vitamin K. Not having enough vitamin K is the main cause of this condition.  Your child s healthcare provider will diagnose this condition. This will be based on your child s signs of bleeding and lab tests for blood clotting times.  The American Academy of Pediatrics recommends that all newborns get a vitamin K shot. This can prevent this condition.     Next steps  Tips to help you get the most from a visit to your child s healthcare provider:   Know the reason for the visit and what you want to happen.  Before your visit, write down questions you want answered.  At the visit, write down the name of a new diagnosis, and any new medicines, treatments, or tests. Also write down any new instructions your provider gives you for your child.  Know why a new medicine or treatment is prescribed and how it will help your child. Also know what the side effects are.  Ask if your child s condition can be treated in other ways.  Know why a test or procedure is recommended and what the results could mean.  Know what to expect if your child does not take the medicine or have the test or procedure.  If your child has a follow-up appointment, write down the date, time, and purpose for that visit.  Know how you can contact your child s provider after office hours. This is important if  your child becomes ill and you have questions or need advice.  [employer] last reviewed this educational content on 4/1/2022 2000-2022 [employer]. All rights reserved. This information is not intended as a substitute for professional medical care. Always follow your healthcare professional's instructions.

## 2023-05-16 NOTE — PROGRESS NOTES
S: Feels well. Baby active. Denies uterine cramping, vaginal bleeding or leaking of fluid  O: Vitals: LMP 09/21/2022 (Exact Date)   BMI= There is no height or weight on file to calculate BMI.  Exam:  Constitutional: healthy, alert and no distress  Respiratory: respirations even and unlabored  Gastrointestinal: Abdomen soft, non-tender. Fundus measures appropriate for gestational age. Fetal heart tones hear without difficulty and within normal limits  : Deferred  Psychiatric: mentation appears normal and affect normal/bright  A:     ICD-10-CM    1. Encounter for supervision of normal first pregnancy in third trimester  Z34.03         P: Discussed GBS screen for next visit. Discussed plans for labor. Discussed patient options for postpartum contraception. Patient is planning condoms  Encouraged patient to call with any questions or concerns.  Return to clinic 2 weeks    Delfina Allred CNM

## 2023-06-02 ENCOUNTER — PRENATAL OFFICE VISIT (OUTPATIENT)
Dept: MIDWIFE SERVICES | Facility: CLINIC | Age: 28
End: 2023-06-02
Payer: COMMERCIAL

## 2023-06-02 VITALS — WEIGHT: 189 LBS | BODY MASS INDEX: 29.6 KG/M2 | SYSTOLIC BLOOD PRESSURE: 100 MMHG | DIASTOLIC BLOOD PRESSURE: 60 MMHG

## 2023-06-02 DIAGNOSIS — Z34.03 ENCOUNTER FOR SUPERVISION OF NORMAL FIRST PREGNANCY IN THIRD TRIMESTER: Primary | ICD-10-CM

## 2023-06-02 LAB — HGB BLD-MCNC: 11 G/DL (ref 11.7–15.7)

## 2023-06-02 PROCEDURE — 87653 STREP B DNA AMP PROBE: CPT | Performed by: ADVANCED PRACTICE MIDWIFE

## 2023-06-02 PROCEDURE — 99207 PR PRENATAL VISIT: CPT | Performed by: ADVANCED PRACTICE MIDWIFE

## 2023-06-02 PROCEDURE — 85018 HEMOGLOBIN: CPT | Performed by: ADVANCED PRACTICE MIDWIFE

## 2023-06-02 PROCEDURE — 36415 COLL VENOUS BLD VENIPUNCTURE: CPT | Performed by: ADVANCED PRACTICE MIDWIFE

## 2023-06-02 NOTE — PROGRESS NOTES
S: Feels well and has no concerns.  Baby active.  Denies uterine cramping, vaginal bleeding or leaking of fluid. No headache, increase in edema, no epigastric pain.   O: Vitals: /60   Wt 85.7 kg (189 lb)   LMP 09/21/2022 (Exact Date)   BMI 29.60 kg/m    BMI= Body mass index is 29.6 kg/m .  Exam:  Constitutional: healthy, alert and no distress  Respiratory: respirations even and unlabored  Gastrointestinal: Abdomen soft, non-tender. Fundus measures appropriate for gestational age. Fetal heart tones hear without difficulty and within normal limits  : Normal external genitalia without lesions  Psychiatric: mentation appears normal and affect normal/bright  A:     ICD-10-CM    1. Encounter for supervision of normal first pregnancy in third trimester  Z34.03 Group B strep PCR     Hemoglobin     Hemoglobin        P: Labor signs and symptoms discussed, aware of numbers to call  Discussed warning signs of PIH/preeclampsia and patient will monitor.  GBS screen completed. Discussed plans for labor. Discussed patient options for postpartum contraception. Patient is planning condoms  Encouraged patient to call with any questions or concerns.  Return to clinic 1 weeks    MARIA M Vallejo, JAHAIRA

## 2023-06-02 NOTE — PATIENT INSTRUCTIONS
"Labor Instructions for Midwife Patients    When to call:  Both during and after office hours call [phone]. There is a Nurse Midwife available to take your calls and answer your questions 24 hours a day.     When to call:  Call anytime you have important concerns about you or your baby.     Call if:  You are having contractions at regular intervals about 5-6 minutes apart lasting 30-60 seconds and becoming increasingly more intense   You have an uncontrollable gush of fluid from your vagina or feel a pop and gush like your water has broken  You have HEAVY bleeding, like heavy period, blood running down your legs, or  soaking a pad.   Some bleeding after a pelvic exam, after intercourse, or in labor when your cervix is dilating is normal and is referred to as \"bloody show\"  You have severe, continuous back or abdominal pain  You feel it is time to go to the hospital  If this is your first labor, call when contractions are very intense and have been about every 3-4 minutes for about an hour  If it is your second labor or more, call when contractions are strong and about every 3-5 minutes or sooner depending on your level of discomfort.     Keep in mind we are always here for you! If you have questions, concerns please don't hesitate to call us.     What to eat/drink in labor: Drink plenty of fluid (water most importantly, juice, soda or tea without caffeine). Eat rice, pasta, soup, cereal, bread/toast, and fruit. Avoid dairy and greasy food as they are difficult to digest and you may experience some nausea during labor.    Comfort measures:  Baths and showers (ok even with ruptured membranes, it may temporarily slow contractions if you are still in the early stage of labor)  Warm/hot packs for back pain or discomfort  Back, belly, or thigh massages  Standing, rocking, walking, leaning over bed or tables, side-lying and sleeping    Miscellaneous:   Contractions are timed from the beginning of one to the beginning " of the next  Try hard to sleep during the early stage of labor when you are not that uncomfortable. Timing of contractions at this point is not important  Even if you cannot sleep, resting in bed or on the couch can help you maintain your energy for labor  When you arrive at the hospital the nurse will check your baby's heartbeat, check your cervix, and will call us. The midwife on call will come in and be with you when you are in active labor  After hours you need to enter the hospital through the emergency room     What Is Group B Strep?  Group B strep (streptococcus) is a common type of bacteria. It can grow in a woman s vagina, rectum, or urinary tract. It most often does not cause harm in adults. But in rare cases, a woman who has group B strep can infect her baby during the birth. This can cause serious illness in the . But treating the mother during labor reduces the risk of the baby becoming infected. And if a  gets group B strep, the infection can be treated.     Facts about group B strep   Learning more about group B strep can help you understand how testing and treatment can help. Here are some basic facts about group B strep:   It is not a sexually transmitted disease.  It is not the same as strep throat. (That is caused by group A strep.)  It often has no symptoms. It may cause no problems in adults.  Test results can be misleading. They may be negative one week and positive the next week.  Group B strep can be spread to the baby during vaginal delivery. It cannot be passed during  (surgical) birth.  A mother with group B strep rarely infects her . (Infection happens only about 1% to 2% of the time.)  When a mother is treated during labor and delivery, her baby almost never becomes infected.  Certain factors during pregnancy increase the risk of a baby becoming infected.  Possible effects on your baby   Group B strep can infect the blood. It can also cause inflammation of  the baby s lungs, brain, or spinal cord. Long-term effects can include blindness, deafness, mental retardation, or cerebral palsy. And in rare cases, infection causes death. Infection is most often found soon after the baby is born.   How your baby may become infected   Group B strep often lives in the vagina or rectum. If the amniotic sac breaks early, bacteria from the vagina can travel to the uterus, reaching the baby. Or, as the baby passes through the birth canal, it can come in contact with the bacteria. In rare cases, group B strep can be passed to the baby after delivery. This is called late-onset group B strep. The source of this type of infection is not well understood. But some experts believe that it happens if the baby is exposed to group B strep in the home, from the parents or siblings, or in the community.   What increases the risk?   Certain risk factors increase the chance that a baby will be infected. They include:   Breaking or leaking of the amniotic sac before 37 weeks of pregnancy  Labor before 37 weeks of pregnancy  Breaking of the amniotic sac more than 18 hours before labor starts  Fever during labor  A urinary tract infection with group B strep at any point in the pregnancy  Having a previous baby born with a group B strep infection  [employer] last reviewed this educational content on 2020-2022 [employer]. All rights reserved. This information is not intended as a substitute for professional medical care. Always follow your healthcare professional's instructions.          Vitamin K Deficiency Bleeding (VKDB) in a  Baby  What is vitamin K deficiency bleeding in a ?  Vitamin K deficiency bleeding (VKDB) is a problem that occurs in some  babies. It most often happens during the first few days and weeks of life. But it can occur up to 6 months of age. This condition used to be called hemorrhagic disease of the .    What causes vitamin K  deficiency bleeding in a ?  Babies are normally born with low levels of vitamin K. Vitamin K is needed for blood to clot. Not having enough vitamin K is the main cause of vitamin deficiency bleeding. If your baby s blood doesn t clot, they may have severe bleeding or a hemorrhage. This can be life-threatening. The cause of vitamin K deficiency depends on the 3 types of VKDB:   Early VKDB. This can occur right after birth or up to 24 hours of age. It's caused by certain medicines the mother took during pregnancy  Classical VKDB. This occurs from 1 to 7 days after birth. It's caused by low levels of vitamin K found in newborns.  Late VKDB. This most often occurs up to 3 months after birth. But it can occur up to 6 months after birth. It can occur in a baby who did not get a vitamin K shot at birth and who was  only.    Which newborns are at risk for vitamin K deficiency bleeding?   These things may make it more likely for a baby to have this condition:   Not getting a vitamin K shot at birth.The American Academy of Pediatrics recommends that all newborns get a vitamin K shot. This can prevent severe bleeding.   Being  only and not getting a vitamin K shot at birth.  Breastmilk has less vitamin K than formula made with cow s milk. The vitamin K shot will provide what a  baby needs. A mother who takes a vitamin K supplement while breastfeeding will not raise her baby's vitamin K level.  Being born to a mother who took certain medicines during pregnancy.  These include medicines for seizures (anticonvulsants) and medicines for blood-clotting problems (anticoagulants).  What are the symptoms of vitamin K deficiency bleeding in a ?   Symptoms can occur a bit differently in each child. They can include:   Blood in your baby's stool that make it black and sticky (tarry)  Blood in your baby's urine  Oozing of blood from around your baby s umbilical cord or circumcision site  Bruising  more easily than normal. This may happen around your baby's head and face.  Beingvery sleepy or fussy. In severe cases, vitamin K deficiency may cause bleeding in and around the brain. Other signs of bleeding in the brain can include seizures or vomiting, not just spitting up.  The symptoms of this condition may be similar to symptoms of other health issues. Make sure your child sees a healthcare provider for a diagnosis.   How is vitamin K deficiency bleeding in a  diagnosed?   The healthcare provider will look at your baby's health history. They will also check your baby for signs of bleeding. Your baby may need lab tests to measure their blood clotting times. The results of these tests can help your child s healthcare provider make the diagnosis.   How is vitamin K deficiency bleeding in a  treated?   Treatment will depend on your child s symptoms, age, and general health. It will also depend on how severe the condition is.   Your baby will probably get a vitamin K shot.   Your baby may need a blood transfusion if they have severe bleeding. If your baby is severely ill, they may need to be treated in the intensive care unit (ICU).   What are possible complications of vitamin K deficiency bleeding in a ?   Vitamin K deficiency bleeding can lead to life-threatening problems. These include dangerous bleeding that can lead to brain damage or death. In the [place] deaths and long-term complications from vitamin K deficiency have been greatly lowered because of vitamin K shots given at birth. But bleeding into the brain, central nervous system, stomach, intestines, or other parts of the body can cause serious problems, or even death.   Can vitamin K deficiency bleeding in a  be prevented?   This condition can be prevented. The American Academy of Pediatrics recommends that all newborns get a vitamin K shot. Your child will get a shot into their upper leg (thigh) muscle. This shot will be given  soon after birth. This will prevent dangerous bleeding.   Key points about vitamin K deficiency bleeding in a    Vitamin K deficiency bleeding is a problem that occurs in some newborns. It often happens during the first few days of life.  Babies are normally born with low levels of vitamin K. Not having enough vitamin K is the main cause of this condition.  Your child s healthcare provider will diagnose this condition. This will be based on your child s signs of bleeding and lab tests for blood clotting times.  The American Academy of Pediatrics recommends that all newborns get a vitamin K shot. This can prevent this condition.     Next steps  Tips to help you get the most from a visit to your child s healthcare provider:   Know the reason for the visit and what you want to happen.  Before your visit, write down questions you want answered.  At the visit, write down the name of a new diagnosis, and any new medicines, treatments, or tests. Also write down any new instructions your provider gives you for your child.  Know why a new medicine or treatment is prescribed and how it will help your child. Also know what the side effects are.  Ask if your child s condition can be treated in other ways.  Know why a test or procedure is recommended and what the results could mean.  Know what to expect if your child does not take the medicine or have the test or procedure.  If your child has a follow-up appointment, write down the date, time, and purpose for that visit.  Know how you can contact your child s provider after office hours. This is important if your child becomes ill and you have questions or need advice.  [employer] last reviewed this educational content on 2022-2022 [employer]. All rights reserved. This information is not intended as a substitute for professional medical care. Always follow your healthcare professional's instructions.          Labor Induction  What You Need to  "Know  What is labor induction?  In most cases, it is best to go into labor naturally. When labor does not start on its own, we may use medicine or other methods to start (induce) labor. This is called induction, which:  Helps the uterus contract  Thins, softens and opens the cervix (opening of the uterus)  When is induction used?  There are two types of induction.  Medical induction may be done to protect the health of the parent or baby if:  There are medical concerns for you or your baby.  You haven't had your baby by 41 weeks.  Induction for non-medical reasons may be done at 39 weeks or later if:  You live far from the hospital.  You've been having contractions for many days.  You've given birth quickly in the past. We will not perform an induction for non-medical reasons before 39 weeks. Studies show that babies born before 39 weeks may struggle with breathing, feeding, sleeping and staying warm. They are more likely to have health problems and may need to stay in the hospital longer. If we start your labor for medical reasons, the benefits will outweigh these risks. We will talk to you about your risks, benefits and alternatives (other options) before we start your labor.  How is induction done?  We may start your labor by doing one or more of the following:  We may need to help your cervix soften and open (sometimes called \"ripening\") to prepare it for labor. There are two ways to do this:  Medicines--these may be in the form of pills that you swallow or medicines that are put into the vagina next to the cervix.  Mechanical--using either a single or double balloon. These are small balloons that are attached to tubes that go up inside the cervix. The balloons are then filled with water to put pressure on the cervix and help the cervix soften and open. Depending on the type of balloon used, you may be allowed to go home after it is placed.  After your cervix is ready:  We may give you medicine through an IV (a " "small tube placed in your vein). This medicine is called Pitocin. It helps the muscles of your uterus to contract.  We may make a small opening in your bag of water (the sac around your baby). This is called an amniotomy. Sometimes called \"breaking the water\". It may help your uterus contract and your cervix open.  What might happen if my labor is induced?  Some of this depends on how your labor is started and how your body responds. Your labor may be more complicated. You and your baby may need more medical treatments. In general:  You may not go into labor right away. If so, we may send you home with follow-up instructions.  It will be important to monitor you and your baby during the induction.  You may not be able to move around during labor. You will have two belts with monitors attached to your belly. These allow the nurse to watch your contractions and your baby's heart rate.  Your labor may take longer than if you went into labor on your own. It might take more than one day.  If you need cervical ripening, it is important to know that it may be many hours (even days) until delivery happens.  Cervical ripening can be slow and require several doses before your body is ready to labor.  A long labor may increase the risk of infection in mother and baby.  Your labor may not progress as planned. This could lead to a  birth.  Can I plan the date of my delivery?  After 39 weeks, you may ask about planning your delivery date. This is only an option if your body--and your baby--are ready. To find out, we will check your cervix and your baby's heart rate.   If you are ready to be induced, we will give you a date and time to come to the hospital. If many patients are in labor that day, we may need to start your labor at another time.  If you are not ready, we will not start your labor. It will be safer for your baby to come on its own.  What else do I need to know?  Before you have an induction, make sure you " understand the reasons, risks and benefits. Ask your doctor or nurse-midwife:  Why do I need to be induced?  What are the risks to my baby?  How will you start my labor?  How will you know if my baby is ready to be born?  How will you know if my body is ready to give birth?  Where can I get more information?  To learn more about induction, you may visit these websites:  The American College of Nurse-Midwives:  www.mymidwife.org  The American College of Obstetricians and Gynecologists: www.acog.org  Childbirth Connection:  www.childbirthconnection.org  March of Dimes: www.marchofdimes.com  Association of Women's Health, Obstetrics, and  Nursing  www.zootst1maf.org/go-the-full-40&#047;  For informational purposes only. Not to replace the advice of your health care provider. Copyright   2008 [employer]. All rights reserved. Clinically reviewed by the  System Operations Leadership team. [employer] [id number] - REV .

## 2023-06-03 LAB — GP B STREP DNA SPEC QL NAA+PROBE: NEGATIVE

## 2023-06-05 ENCOUNTER — TELEPHONE (OUTPATIENT)
Dept: MIDWIFE SERVICES | Facility: CLINIC | Age: 28
End: 2023-06-05
Payer: COMMERCIAL

## 2023-06-05 NOTE — TELEPHONE ENCOUNTER
Form received from: [employer]    Form requesting following info/need: STD    AZALEA needed?: No    Location of form: [facility]'s desk    When completed the route for return: Fax to # on form

## 2023-06-08 ENCOUNTER — PRENATAL OFFICE VISIT (OUTPATIENT)
Dept: MIDWIFE SERVICES | Facility: CLINIC | Age: 28
End: 2023-06-08
Payer: COMMERCIAL

## 2023-06-08 VITALS — DIASTOLIC BLOOD PRESSURE: 72 MMHG | WEIGHT: 187 LBS | BODY MASS INDEX: 29.29 KG/M2 | SYSTOLIC BLOOD PRESSURE: 104 MMHG

## 2023-06-08 DIAGNOSIS — Z34.03 ENCOUNTER FOR SUPERVISION OF NORMAL FIRST PREGNANCY IN THIRD TRIMESTER: Primary | ICD-10-CM

## 2023-06-08 PROCEDURE — 99207 PR PRENATAL VISIT: CPT | Performed by: ADVANCED PRACTICE MIDWIFE

## 2023-06-08 NOTE — PROGRESS NOTES
S: Feels well and is ready for baby,  Baby active.  Denies uterine cramping, vaginal bleeding or leaking of fluid. No headache, increase in edema, no epigastric pain.  Last weekend of work is this weekend.  O: Vitals: /72   Wt 84.8 kg (187 lb)   LMP 09/21/2022 (Exact Date)   BMI 29.29 kg/m    BMI= Body mass index is 29.29 kg/m .  Exam:  Constitutional: healthy, alert and no distress  Respiratory: respirations even and unlabored  Gastrointestinal: Abdomen soft, non-tender. Fundus measures appropriate for gestational age. Fetal heart tones hear without difficulty and within normal limits  : Deferred  Psychiatric: mentation appears normal and affect normal/bright  A:     ICD-10-CM    1. Encounter for supervision of normal first pregnancy in third trimester  Z34.03         P: Labor signs and symptoms discussed, aware of numbers to call  Discussed warning signs of PIH/preeclampsia and patient will monitor.  GBS screen completed. Discussed plans for labor.   Discussed patient options for postpartum contraception. Patient is planning condoms. Discussed using water based or silicon based lubricant.  Encouraged patient to call with any questions or concerns.  Return to clinic 1 weeks    MARIA M Sanchez CNM

## 2023-06-08 NOTE — NURSING NOTE
"Chief Complaint   Patient presents with     Prenatal Care     37w 1d        Initial /72   Wt 84.8 kg (187 lb)   LMP 2022 (Exact Date)   BMI 29.29 kg/m   Estimated body mass index is 29.29 kg/m  as calculated from the following:    Height as of 22: 1.702 m (5' 7\").    Weight as of this encounter: 84.8 kg (187 lb).  BP completed using cuff size: regular    Questioned patient about current smoking habits.  Pt. has never smoked.            "

## 2023-06-16 ENCOUNTER — PRENATAL OFFICE VISIT (OUTPATIENT)
Dept: MIDWIFE SERVICES | Facility: CLINIC | Age: 28
End: 2023-06-16
Payer: COMMERCIAL

## 2023-06-16 VITALS — WEIGHT: 192.3 LBS | SYSTOLIC BLOOD PRESSURE: 106 MMHG | DIASTOLIC BLOOD PRESSURE: 68 MMHG | BODY MASS INDEX: 30.12 KG/M2

## 2023-06-16 DIAGNOSIS — Z34.03 ENCOUNTER FOR SUPERVISION OF NORMAL FIRST PREGNANCY IN THIRD TRIMESTER: Primary | ICD-10-CM

## 2023-06-16 PROCEDURE — 99207 PR PRENATAL VISIT: CPT | Performed by: ADVANCED PRACTICE MIDWIFE

## 2023-06-16 NOTE — PROGRESS NOTES
S: Feels good and has no concerns. She would  like her cervix checked today,  Baby active.  Denies uterine cramping, vaginal bleeding or leaking of fluid. No headache, increase in edema, no epigastric pain.   O: Vitals: /68 (BP Location: Right arm, Cuff Size: Adult Regular)   Wt 87.2 kg (192 lb 4.8 oz)   LMP 09/21/2022 (Exact Date)   BMI 30.12 kg/m    BMI= Body mass index is 30.12 kg/m .     Exam:  Constitutional: healthy, alert and no distress  Respiratory: respirations even and unlabored  Gastrointestinal: Abdomen soft, non-tender. Fundus measures appropriate for gestational age. Fetal heart tones hear without difficulty and within normal limits  : Cervical exam 1/50%/5, posterior  Psychiatric: mentation appears normal and affect normal/bright    A:     ICD-10-CM    1. Encounter for supervision of normal first pregnancy in third trimester  Z34.03         P: Labor signs and symptoms discussed, aware of numbers to call  Discussed warning signs of PIH/preeclampsia and patient will monitor.  GBS screen completed. Discussed plans for labor. Discussed patient options for postpartum contraception. Patient is planning condoms  Encouraged patient to call with any questions or concerns.  Return to clinic 1 weeks    Geetha Roque CNM student    I was present with the student who participated in the service and documentation of the services provided. I have verified the history and personally performed the physical exam and medical decision making as documented by the student and edited by me.  MARIA M Vallejo CNM 6/16/2023 12:45 PM

## 2023-06-16 NOTE — NURSING NOTE
"Chief Complaint   Patient presents with     Prenatal Care     38 weeks 2 days   GBS= Neg       Initial /68 (BP Location: Right arm, Cuff Size: Adult Regular)   Wt 87.2 kg (192 lb 4.8 oz)   LMP 2022 (Exact Date)   BMI 30.12 kg/m   Estimated body mass index is 30.12 kg/m  as calculated from the following:    Height as of 22: 1.702 m (5' 7\").    Weight as of this encounter: 87.2 kg (192 lb 4.8 oz).  BP completed using cuff size: regular    Questioned patient about current smoking habits.  Pt. has never smoked.    38w2d      The following HM Due: NONE      +FM daily   +[facility]   GBS=neg         Princess Obrien, CMA on 2023 at 11:38 AM                  "

## 2023-06-16 NOTE — PATIENT INSTRUCTIONS
Labor and Childbirth: Support Person's Notes  You may be excited and anxious about the impending labor and childbirth. You may also wonder how you can help. Learning about the birth process can help you know what to expect. And following the suggestions below can help ease you and the mother through this exciting time.   During early labor    Be sure to time the contractions.    Keep the setting soothing. Dim lights and prevent loud noises. Try playing relaxing music.    Suggest that the mother soak in a warm tub to ease the pain of contractions.    Try to distract the mother from the contractions with a short walk or massage.    Encourage the mother to rest if she's tired.    As contractions become stronger, help her use labor breathing techniques.    During active labor    Have the mother walk or change position at least once an hour. This improves circulation and helps the baby descend.    Keep reminding the mother to breathe and relax through each contraction.    Reassure her. Try to keep her from getting anxious or overstressed.    Take care of yourself. Take a short break to eat or go to the bathroom when you need to.    Rest when the mother does. You'll both benefit.    During a vaginal birth    Help the mother into a pushing position. Support her body as she pushes. A semi-sitting or semi-squatting position allows gravity to assist the birth.    Remind her to rest between contractions. Encourage her by telling her when the baby's head appears.    Keep in mind that you may be masked and gowned for the birth, depending on hospital policy.    During a  birth    You will most likely be able to stay with the mother during the . If you remain with her, you'll wear a mask and surgical gown.    Remember that  birth is surgery. The mother's abdominal area will be draped and out of view. Don't touch the draped areas, which are sterile.    If you aren't allowed to attend the delivery or  aren't comfortable doing so, wait with other friends and family members in the family waiting area.    [employer] last reviewed this educational content on 7/1/2021 2000-2022 [employer]. All rights reserved. This information is not intended as a substitute for professional medical care. Always follow your healthcare professional's instructions.

## 2023-06-23 ENCOUNTER — PRENATAL OFFICE VISIT (OUTPATIENT)
Dept: MIDWIFE SERVICES | Facility: CLINIC | Age: 28
End: 2023-06-23
Payer: COMMERCIAL

## 2023-06-23 VITALS — DIASTOLIC BLOOD PRESSURE: 60 MMHG | SYSTOLIC BLOOD PRESSURE: 104 MMHG | WEIGHT: 192 LBS | BODY MASS INDEX: 30.07 KG/M2

## 2023-06-23 DIAGNOSIS — Z34.03 ENCOUNTER FOR SUPERVISION OF NORMAL FIRST PREGNANCY IN THIRD TRIMESTER: Primary | ICD-10-CM

## 2023-06-23 PROCEDURE — 59426 ANTEPARTUM CARE ONLY: CPT | Performed by: ADVANCED PRACTICE MIDWIFE

## 2023-06-23 PROCEDURE — 99207 PR PRENATAL VISIT: CPT | Performed by: ADVANCED PRACTICE MIDWIFE

## 2023-06-23 NOTE — PROGRESS NOTES
Feeling well.  Baby is active. Denies any leaking of fluid, vaginal bleeding, regular uterine contractions, or headaches or other concerns.  Cervical exam and membrane sweep.    She would like to be induced at 40 weeks if possible.  We discussed that this is an option if the cervix is ripe.  She will return in 1 week to reassess for induction.    Reviewed to call for contractions, loss of fluid, vaginal bleeding, decreased fetal movement or any other questions or concerns.    RTC in 1 weeks.  Emily Carbajal, FRED, APRN, CNM

## 2023-06-23 NOTE — NURSING NOTE
"Chief Complaint   Patient presents with     Prenatal Care   39w2d  Discuss membrane sweep  Here with Gadiel    initial /60   Wt 87.1 kg (192 lb)   LMP 09/21/2022 (Exact Date)   BMI 30.07 kg/m   Estimated body mass index is 30.07 kg/m  as calculated from the following:    Height as of 12/5/22: 1.702 m (5' 7\").    Weight as of this encounter: 87.1 kg (192 lb).  BP completed using cuff size regular.  Echo Piña CMA    "

## 2023-06-28 ENCOUNTER — PRENATAL OFFICE VISIT (OUTPATIENT)
Dept: MIDWIFE SERVICES | Facility: CLINIC | Age: 28
End: 2023-06-28
Payer: COMMERCIAL

## 2023-06-28 VITALS — WEIGHT: 191 LBS | BODY MASS INDEX: 29.91 KG/M2 | SYSTOLIC BLOOD PRESSURE: 116 MMHG | DIASTOLIC BLOOD PRESSURE: 62 MMHG

## 2023-06-28 DIAGNOSIS — Z34.03 ENCOUNTER FOR SUPERVISION OF NORMAL FIRST PREGNANCY IN THIRD TRIMESTER: Primary | ICD-10-CM

## 2023-06-28 PROCEDURE — 99207 PR PRENATAL VISIT: CPT | Performed by: REGISTERED NURSE

## 2023-06-28 NOTE — PATIENT INSTRUCTIONS
"Labor Instructions for Midwife Patients    When to call:  Both during and after office hours call [phone]. There is a Nurse Midwife available to take your calls and answer your questions 24 hours a day.     When to call:  Call anytime you have important concerns about you or your baby.     Call if:  You are having contractions at regular intervals about 5-6 minutes apart lasting 30-60 seconds and becoming increasingly more intense   You have an uncontrollable gush of fluid from your vagina or feel a pop and gush like your water has broken  You have HEAVY bleeding, like heavy period, blood running down your legs, or  soaking a pad.   Some bleeding after a pelvic exam, after intercourse, or in labor when your cervix is dilating is normal and is referred to as \"bloody show\"  You have severe, continuous back or abdominal pain  You feel it is time to go to the hospital  If this is your first labor, call when contractions are very intense and have been about every 3-4 minutes for about an hour  If it is your second labor or more, call when contractions are strong and about every 3-5 minutes or sooner depending on your level of discomfort.     Keep in mind we are always here for you! If you have questions, concerns please don't hesitate to call us.     What to eat/drink in labor: Drink plenty of fluid (water most importantly, juice, soda or tea without caffeine). Eat rice, pasta, soup, cereal, bread/toast, and fruit. Avoid dairy and greasy food as they are difficult to digest and you may experience some nausea during labor.    Comfort measures:  Baths and showers (ok even with ruptured membranes, it may temporarily slow contractions if you are still in the early stage of labor)  Warm/hot packs for back pain or discomfort  Back, belly, or thigh massages  Standing, rocking, walking, leaning over bed or tables, side-lying and sleeping    Miscellaneous:   Contractions are timed from the beginning of one to the beginning " of the next  Try hard to sleep during the early stage of labor when you are not that uncomfortable. Timing of contractions at this point is not important  Even if you cannot sleep, resting in bed or on the couch can help you maintain your energy for labor  When you arrive at the hospital the nurse will check your baby's heartbeat, check your cervix, and will call us. The midwife on call will come in and be with you when you are in active labor  After hours you need to enter the hospital through the emergency room

## 2023-06-30 ENCOUNTER — ANESTHESIA EVENT (OUTPATIENT)
Dept: OBGYN | Facility: CLINIC | Age: 28
End: 2023-06-30
Payer: COMMERCIAL

## 2023-06-30 ENCOUNTER — HOSPITAL ENCOUNTER (INPATIENT)
Facility: CLINIC | Age: 28
LOS: 1 days | Discharge: HOME | End: 2023-07-01
Attending: ADVANCED PRACTICE MIDWIFE | Admitting: ADVANCED PRACTICE MIDWIFE
Payer: COMMERCIAL

## 2023-06-30 ENCOUNTER — ANESTHESIA (OUTPATIENT)
Dept: OBGYN | Facility: CLINIC | Age: 28
End: 2023-06-30
Payer: COMMERCIAL

## 2023-06-30 ENCOUNTER — TELEPHONE (OUTPATIENT)
Dept: OBGYN | Facility: CLINIC | Age: 28
End: 2023-06-30

## 2023-06-30 DIAGNOSIS — Z34.01 ENCOUNTER FOR SUPERVISION OF NORMAL FIRST PREGNANCY IN FIRST TRIMESTER: Primary | ICD-10-CM

## 2023-06-30 LAB
ABO/RH(D): NORMAL
ANTIBODY SCREEN: NEGATIVE
ERYTHROCYTE [DISTWIDTH] IN BLOOD BY AUTOMATED COUNT: 12.8 % (ref 10–15)
HCT VFR BLD AUTO: 36 % (ref 35–47)
HGB BLD-MCNC: 12.2 G/DL (ref 11.7–15.7)
MCH RBC QN AUTO: 29.8 PG (ref 26.5–33)
MCHC RBC AUTO-ENTMCNC: 33.9 G/DL (ref 31.5–36.5)
MCV RBC AUTO: 88 FL (ref 78–100)
PLATELET # BLD AUTO: 248 10E3/UL (ref 150–450)
RBC # BLD AUTO: 4.09 10E6/UL (ref 3.8–5.2)
RUPTURE OF FETAL MEMBRANES BY ROM PLUS: POSITIVE
SPECIMEN EXPIRATION DATE: NORMAL
WBC # BLD AUTO: 11.7 10E3/UL (ref 4–11)

## 2023-06-30 PROCEDURE — 86901 BLOOD TYPING SEROLOGIC RH(D): CPT | Performed by: ADVANCED PRACTICE MIDWIFE

## 2023-06-30 PROCEDURE — 120N000001 HC R&B MED SURG/OB

## 2023-06-30 PROCEDURE — 3E0R3BZ INTRODUCTION OF ANESTHETIC AGENT INTO SPINAL CANAL, PERCUTANEOUS APPROACH: ICD-10-PCS | Performed by: ANESTHESIOLOGY

## 2023-06-30 PROCEDURE — 722N000001 HC LABOR CARE VAGINAL DELIVERY SINGLE

## 2023-06-30 PROCEDURE — 85027 COMPLETE CBC AUTOMATED: CPT | Performed by: ADVANCED PRACTICE MIDWIFE

## 2023-06-30 PROCEDURE — 0UQMXZZ REPAIR VULVA, EXTERNAL APPROACH: ICD-10-PCS | Performed by: ADVANCED PRACTICE MIDWIFE

## 2023-06-30 PROCEDURE — 250N000011 HC RX IP 250 OP 636: Mod: JZ | Performed by: ADVANCED PRACTICE MIDWIFE

## 2023-06-30 PROCEDURE — 00HU33Z INSERTION OF INFUSION DEVICE INTO SPINAL CANAL, PERCUTANEOUS APPROACH: ICD-10-PCS | Performed by: ANESTHESIOLOGY

## 2023-06-30 PROCEDURE — 84112 EVAL AMNIOTIC FLUID PROTEIN: CPT | Performed by: ADVANCED PRACTICE MIDWIFE

## 2023-06-30 PROCEDURE — 59410 OBSTETRICAL CARE: CPT | Performed by: ADVANCED PRACTICE MIDWIFE

## 2023-06-30 PROCEDURE — 370N000003 HC ANESTHESIA WARD SERVICE: Performed by: ANESTHESIOLOGY

## 2023-06-30 PROCEDURE — 250N000009 HC RX 250: Performed by: ADVANCED PRACTICE MIDWIFE

## 2023-06-30 PROCEDURE — 258N000003 HC RX IP 258 OP 636: Performed by: ADVANCED PRACTICE MIDWIFE

## 2023-06-30 PROCEDURE — 250N000011 HC RX IP 250 OP 636: Performed by: ANESTHESIOLOGY

## 2023-06-30 PROCEDURE — 86850 RBC ANTIBODY SCREEN: CPT | Performed by: ADVANCED PRACTICE MIDWIFE

## 2023-06-30 RX ORDER — TERBUTALINE SULFATE 1 MG/ML
0.25 INJECTION, SOLUTION SUBCUTANEOUS
Status: DISCONTINUED | OUTPATIENT
Start: 2023-06-30 | End: 2023-06-30 | Stop reason: HOSPADM

## 2023-06-30 RX ORDER — SODIUM CHLORIDE, SODIUM LACTATE, POTASSIUM CHLORIDE, CALCIUM CHLORIDE 600; 310; 30; 20 MG/100ML; MG/100ML; MG/100ML; MG/100ML
INJECTION, SOLUTION INTRAVENOUS CONTINUOUS PRN
Status: DISCONTINUED | OUTPATIENT
Start: 2023-06-30 | End: 2023-06-30 | Stop reason: HOSPADM

## 2023-06-30 RX ORDER — CITRIC ACID/SODIUM CITRATE 334-500MG
30 SOLUTION, ORAL ORAL
Status: DISCONTINUED | OUTPATIENT
Start: 2023-06-30 | End: 2023-06-30 | Stop reason: HOSPADM

## 2023-06-30 RX ORDER — OXYTOCIN/0.9 % SODIUM CHLORIDE 30/500 ML
340 PLASTIC BAG, INJECTION (ML) INTRAVENOUS CONTINUOUS PRN
Status: DISCONTINUED | OUTPATIENT
Start: 2023-06-30 | End: 2023-06-30 | Stop reason: HOSPADM

## 2023-06-30 RX ORDER — ONDANSETRON 2 MG/ML
4 INJECTION INTRAMUSCULAR; INTRAVENOUS EVERY 6 HOURS PRN
Status: DISCONTINUED | OUTPATIENT
Start: 2023-06-30 | End: 2023-06-30 | Stop reason: HOSPADM

## 2023-06-30 RX ORDER — KETOROLAC TROMETHAMINE 30 MG/ML
30 INJECTION, SOLUTION INTRAMUSCULAR; INTRAVENOUS
Status: DISCONTINUED | OUTPATIENT
Start: 2023-06-30 | End: 2023-07-01

## 2023-06-30 RX ORDER — CALCIUM CARBONATE 500 MG/1
1000 TABLET, CHEWABLE ORAL 3 TIMES DAILY PRN
Status: DISCONTINUED | OUTPATIENT
Start: 2023-06-30 | End: 2023-07-01 | Stop reason: HOSPADM

## 2023-06-30 RX ORDER — OXYTOCIN/0.9 % SODIUM CHLORIDE 30/500 ML
340 PLASTIC BAG, INJECTION (ML) INTRAVENOUS CONTINUOUS PRN
Status: DISCONTINUED | OUTPATIENT
Start: 2023-06-30 | End: 2023-07-01 | Stop reason: HOSPADM

## 2023-06-30 RX ORDER — OXYTOCIN/0.9 % SODIUM CHLORIDE 30/500 ML
100-340 PLASTIC BAG, INJECTION (ML) INTRAVENOUS CONTINUOUS PRN
Status: DISCONTINUED | OUTPATIENT
Start: 2023-06-30 | End: 2023-07-01

## 2023-06-30 RX ORDER — OXYTOCIN 10 [USP'U]/ML
10 INJECTION, SOLUTION INTRAMUSCULAR; INTRAVENOUS
Status: DISCONTINUED | OUTPATIENT
Start: 2023-06-30 | End: 2023-07-01

## 2023-06-30 RX ORDER — ONDANSETRON 4 MG/1
4 TABLET, ORALLY DISINTEGRATING ORAL EVERY 6 HOURS PRN
Status: DISCONTINUED | OUTPATIENT
Start: 2023-06-30 | End: 2023-06-30 | Stop reason: HOSPADM

## 2023-06-30 RX ORDER — ACETAMINOPHEN 325 MG/1
650 TABLET ORAL EVERY 4 HOURS PRN
Status: DISCONTINUED | OUTPATIENT
Start: 2023-06-30 | End: 2023-07-01 | Stop reason: HOSPADM

## 2023-06-30 RX ORDER — METOCLOPRAMIDE HYDROCHLORIDE 5 MG/ML
10 INJECTION INTRAMUSCULAR; INTRAVENOUS EVERY 6 HOURS PRN
Status: DISCONTINUED | OUTPATIENT
Start: 2023-06-30 | End: 2023-06-30 | Stop reason: HOSPADM

## 2023-06-30 RX ORDER — NALOXONE HYDROCHLORIDE 0.4 MG/ML
0.4 INJECTION, SOLUTION INTRAMUSCULAR; INTRAVENOUS; SUBCUTANEOUS
Status: DISCONTINUED | OUTPATIENT
Start: 2023-06-30 | End: 2023-06-30 | Stop reason: HOSPADM

## 2023-06-30 RX ORDER — TERBUTALINE SULFATE 1 MG/ML
0.25 INJECTION, SOLUTION SUBCUTANEOUS
Status: DISCONTINUED | OUTPATIENT
Start: 2023-06-30 | End: 2023-06-30

## 2023-06-30 RX ORDER — BISACODYL 10 MG
10 SUPPOSITORY, RECTAL RECTAL DAILY PRN
Status: DISCONTINUED | OUTPATIENT
Start: 2023-06-30 | End: 2023-07-01 | Stop reason: HOSPADM

## 2023-06-30 RX ORDER — OXYTOCIN 10 [USP'U]/ML
10 INJECTION, SOLUTION INTRAMUSCULAR; INTRAVENOUS
Status: DISCONTINUED | OUTPATIENT
Start: 2023-06-30 | End: 2023-06-30 | Stop reason: HOSPADM

## 2023-06-30 RX ORDER — IBUPROFEN 800 MG/1
800 TABLET, FILM COATED ORAL EVERY 6 HOURS PRN
Status: DISCONTINUED | OUTPATIENT
Start: 2023-06-30 | End: 2023-07-01 | Stop reason: HOSPADM

## 2023-06-30 RX ORDER — PROCHLORPERAZINE 25 MG
25 SUPPOSITORY, RECTAL RECTAL EVERY 12 HOURS PRN
Status: DISCONTINUED | OUTPATIENT
Start: 2023-06-30 | End: 2023-06-30 | Stop reason: HOSPADM

## 2023-06-30 RX ORDER — TRANEXAMIC ACID 10 MG/ML
1 INJECTION, SOLUTION INTRAVENOUS EVERY 30 MIN PRN
Status: DISCONTINUED | OUTPATIENT
Start: 2023-06-30 | End: 2023-06-30 | Stop reason: HOSPADM

## 2023-06-30 RX ORDER — OXYTOCIN/0.9 % SODIUM CHLORIDE 30/500 ML
1-24 PLASTIC BAG, INJECTION (ML) INTRAVENOUS CONTINUOUS
Status: DISCONTINUED | OUTPATIENT
Start: 2023-06-30 | End: 2023-06-30

## 2023-06-30 RX ORDER — NALBUPHINE HYDROCHLORIDE 20 MG/ML
2.5-5 INJECTION, SOLUTION INTRAMUSCULAR; INTRAVENOUS; SUBCUTANEOUS EVERY 6 HOURS PRN
Status: DISCONTINUED | OUTPATIENT
Start: 2023-06-30 | End: 2023-07-01

## 2023-06-30 RX ORDER — IBUPROFEN 800 MG/1
800 TABLET, FILM COATED ORAL
Status: DISCONTINUED | OUTPATIENT
Start: 2023-06-30 | End: 2023-07-01

## 2023-06-30 RX ORDER — MISOPROSTOL 200 UG/1
400 TABLET ORAL
Status: DISCONTINUED | OUTPATIENT
Start: 2023-06-30 | End: 2023-06-30 | Stop reason: HOSPADM

## 2023-06-30 RX ORDER — HYDROCORTISONE 25 MG/G
CREAM TOPICAL 3 TIMES DAILY PRN
Status: DISCONTINUED | OUTPATIENT
Start: 2023-06-30 | End: 2023-07-01 | Stop reason: HOSPADM

## 2023-06-30 RX ORDER — NALOXONE HYDROCHLORIDE 0.4 MG/ML
0.2 INJECTION, SOLUTION INTRAMUSCULAR; INTRAVENOUS; SUBCUTANEOUS
Status: DISCONTINUED | OUTPATIENT
Start: 2023-06-30 | End: 2023-06-30 | Stop reason: HOSPADM

## 2023-06-30 RX ORDER — OXYTOCIN 10 [USP'U]/ML
10 INJECTION, SOLUTION INTRAMUSCULAR; INTRAVENOUS
Status: DISCONTINUED | OUTPATIENT
Start: 2023-06-30 | End: 2023-07-01 | Stop reason: HOSPADM

## 2023-06-30 RX ORDER — OXYTOCIN/0.9 % SODIUM CHLORIDE 30/500 ML
1-24 PLASTIC BAG, INJECTION (ML) INTRAVENOUS CONTINUOUS
Status: DISCONTINUED | OUTPATIENT
Start: 2023-06-30 | End: 2023-06-30 | Stop reason: HOSPADM

## 2023-06-30 RX ORDER — MISOPROSTOL 200 UG/1
400 TABLET ORAL
Status: DISCONTINUED | OUTPATIENT
Start: 2023-06-30 | End: 2023-07-01 | Stop reason: HOSPADM

## 2023-06-30 RX ORDER — CARBOPROST TROMETHAMINE 250 UG/ML
250 INJECTION, SOLUTION INTRAMUSCULAR
Status: DISCONTINUED | OUTPATIENT
Start: 2023-06-30 | End: 2023-06-30 | Stop reason: HOSPADM

## 2023-06-30 RX ORDER — MISOPROSTOL 200 UG/1
800 TABLET ORAL
Status: DISCONTINUED | OUTPATIENT
Start: 2023-06-30 | End: 2023-07-01 | Stop reason: HOSPADM

## 2023-06-30 RX ORDER — BUPROPION HYDROCHLORIDE 150 MG/1
150 TABLET ORAL EVERY MORNING
Status: DISCONTINUED | OUTPATIENT
Start: 2023-07-01 | End: 2023-07-01 | Stop reason: HOSPADM

## 2023-06-30 RX ORDER — DOCUSATE SODIUM 100 MG/1
100 CAPSULE, LIQUID FILLED ORAL DAILY
Status: DISCONTINUED | OUTPATIENT
Start: 2023-06-30 | End: 2023-07-01 | Stop reason: HOSPADM

## 2023-06-30 RX ORDER — MISOPROSTOL 200 UG/1
800 TABLET ORAL
Status: DISCONTINUED | OUTPATIENT
Start: 2023-06-30 | End: 2023-06-30 | Stop reason: HOSPADM

## 2023-06-30 RX ORDER — MODIFIED LANOLIN
OINTMENT (GRAM) TOPICAL
Status: DISCONTINUED | OUTPATIENT
Start: 2023-06-30 | End: 2023-07-01 | Stop reason: HOSPADM

## 2023-06-30 RX ORDER — FENTANYL CITRATE 50 UG/ML
100 INJECTION, SOLUTION INTRAMUSCULAR; INTRAVENOUS
Status: DISCONTINUED | OUTPATIENT
Start: 2023-06-30 | End: 2023-06-30 | Stop reason: HOSPADM

## 2023-06-30 RX ORDER — PRENATAL VIT/IRON FUM/FOLIC AC 27MG-0.8MG
1 TABLET ORAL DAILY
Status: DISCONTINUED | OUTPATIENT
Start: 2023-06-30 | End: 2023-07-01 | Stop reason: HOSPADM

## 2023-06-30 RX ORDER — SODIUM CHLORIDE, SODIUM LACTATE, POTASSIUM CHLORIDE, CALCIUM CHLORIDE 600; 310; 30; 20 MG/100ML; MG/100ML; MG/100ML; MG/100ML
INJECTION, SOLUTION INTRAVENOUS CONTINUOUS PRN
Status: DISCONTINUED | OUTPATIENT
Start: 2023-06-30 | End: 2023-06-30

## 2023-06-30 RX ORDER — CARBOPROST TROMETHAMINE 250 UG/ML
250 INJECTION, SOLUTION INTRAMUSCULAR
Status: DISCONTINUED | OUTPATIENT
Start: 2023-06-30 | End: 2023-07-01 | Stop reason: HOSPADM

## 2023-06-30 RX ORDER — TRANEXAMIC ACID 10 MG/ML
1 INJECTION, SOLUTION INTRAVENOUS EVERY 30 MIN PRN
Status: DISCONTINUED | OUTPATIENT
Start: 2023-06-30 | End: 2023-07-01 | Stop reason: HOSPADM

## 2023-06-30 RX ORDER — SODIUM CHLORIDE, SODIUM LACTATE, POTASSIUM CHLORIDE, CALCIUM CHLORIDE 600; 310; 30; 20 MG/100ML; MG/100ML; MG/100ML; MG/100ML
INJECTION, SOLUTION INTRAVENOUS CONTINUOUS
Status: DISCONTINUED | OUTPATIENT
Start: 2023-06-30 | End: 2023-06-30 | Stop reason: HOSPADM

## 2023-06-30 RX ORDER — LIDOCAINE 40 MG/G
CREAM TOPICAL
Status: DISCONTINUED | OUTPATIENT
Start: 2023-06-30 | End: 2023-06-30 | Stop reason: HOSPADM

## 2023-06-30 RX ORDER — METHYLERGONOVINE MALEATE 0.2 MG/ML
200 INJECTION INTRAVENOUS
Status: DISCONTINUED | OUTPATIENT
Start: 2023-06-30 | End: 2023-06-30 | Stop reason: HOSPADM

## 2023-06-30 RX ORDER — METOCLOPRAMIDE 10 MG/1
10 TABLET ORAL EVERY 6 HOURS PRN
Status: DISCONTINUED | OUTPATIENT
Start: 2023-06-30 | End: 2023-06-30 | Stop reason: HOSPADM

## 2023-06-30 RX ORDER — FAMOTIDINE 20 MG/1
20 TABLET, FILM COATED ORAL DAILY
Status: DISCONTINUED | OUTPATIENT
Start: 2023-06-30 | End: 2023-07-01 | Stop reason: HOSPADM

## 2023-06-30 RX ORDER — FENTANYL CITRATE-0.9 % NACL/PF 10 MCG/ML
100 PLASTIC BAG, INJECTION (ML) INTRAVENOUS EVERY 5 MIN PRN
Status: DISCONTINUED | OUTPATIENT
Start: 2023-06-30 | End: 2023-06-30 | Stop reason: HOSPADM

## 2023-06-30 RX ORDER — PROCHLORPERAZINE MALEATE 10 MG
10 TABLET ORAL EVERY 6 HOURS PRN
Status: DISCONTINUED | OUTPATIENT
Start: 2023-06-30 | End: 2023-06-30 | Stop reason: HOSPADM

## 2023-06-30 RX ORDER — METHYLERGONOVINE MALEATE 0.2 MG/ML
200 INJECTION INTRAVENOUS
Status: DISCONTINUED | OUTPATIENT
Start: 2023-06-30 | End: 2023-07-01 | Stop reason: HOSPADM

## 2023-06-30 RX ADMIN — Medication 2 MILLI-UNITS/MIN: at 14:48

## 2023-06-30 RX ADMIN — METOCLOPRAMIDE HYDROCHLORIDE 10 MG: 5 INJECTION INTRAMUSCULAR; INTRAVENOUS at 11:15

## 2023-06-30 RX ADMIN — SODIUM CHLORIDE, POTASSIUM CHLORIDE, SODIUM LACTATE AND CALCIUM CHLORIDE: 600; 310; 30; 20 INJECTION, SOLUTION INTRAVENOUS at 12:49

## 2023-06-30 RX ADMIN — Medication: at 12:21

## 2023-06-30 RX ADMIN — SODIUM CHLORIDE, POTASSIUM CHLORIDE, SODIUM LACTATE AND CALCIUM CHLORIDE 1000 ML: 600; 310; 30; 20 INJECTION, SOLUTION INTRAVENOUS at 10:39

## 2023-06-30 RX ADMIN — KETOROLAC TROMETHAMINE 30 MG: 30 INJECTION, SOLUTION INTRAMUSCULAR; INTRAVENOUS at 18:02

## 2023-06-30 RX ADMIN — FENTANYL CITRATE 100 MCG: 50 INJECTION, SOLUTION INTRAMUSCULAR; INTRAVENOUS at 11:19

## 2023-06-30 ASSESSMENT — ACTIVITIES OF DAILY LIVING (ADL)
ADLS_ACUITY_SCORE: 18

## 2023-06-30 NOTE — ANESTHESIA PREPROCEDURE EVALUATION
Anesthesia Pre-Procedure Evaluation    Patient: Arcelia Andersen   MRN: [id number] : 1995        Procedure : * No procedures listed *          Past Medical History:   Diagnosis Date     Anemia      Depressive disorder      Major depression, single episode 2019    Created by Conversion   Formatting of this note might be different from the original. Created by Conversion Formatting of this note might be different from the original. Created by Conversion Created by Conversion Formatting of this note might be different from the original. Created by Conversion     Urinary tract infection      Varicella       No past surgical history on file.   Allergies   Allergen Reactions     Tree Nut [Nuts] Hives, Itching and Shortness Of Breath     Nuts - Unspecified [Nuts] Hives and Itching     Annotation: tree nuts cause throat swelling        Social History     Tobacco Use     Smoking status: Never     Smokeless tobacco: Never   Substance Use Topics     Alcohol use: Not Currently     Comment: Alcoholic Drinks/day: occasionally once a month      Wt Readings from Last 1 Encounters:   23 87.1 kg (192 lb)        Anesthesia Evaluation            ROS/MED HX  ENT/Pulmonary:  - neg pulmonary ROS     Neurologic:  - neg neurologic ROS     Cardiovascular:  - neg cardiovascular ROS     METS/Exercise Tolerance: >4 METS    Hematologic:  - neg hematologic  ROS     Musculoskeletal:  - neg musculoskeletal ROS     GI/Hepatic:     (+) GERD,     Renal/Genitourinary:  - neg Renal ROS     Endo:  - neg endo ROS     Psychiatric/Substance Use:     (+) psychiatric history anxiety and depression     Infectious Disease:  - neg infectious disease ROS     Malignancy:  - neg malignancy ROS     Other:  - neg other ROS          Physical Exam    Airway        Mallampati: II   TM distance: > 3 FB   Neck ROM: full   Mouth opening: > 3 cm    Respiratory Devices and Support         Dental           Cardiovascular   cardiovascular exam normal           Pulmonary   pulmonary exam normal            Other findings: Lab Test        06/30/23 06/02/23 04/06/23 11/28/22                       0856          0922          1218          1419          WBC          11.7*         --          8.4          6.7           HGB          12.2         11.0*        11.2*        11.3*         MCV          88            --          91           87            PLT          248           --          206          265            Lab Test        06/18/20                       1336          NA           138           POTASSIUM    4.5           CHLORIDE     104           CO2          25            BUN          14            CR           0.89          ANIONGAP     9             DI          9.6           GLC          87              OUTSIDE LABS:  CBC:   Lab Results   Component Value Date    WBC 11.7 (H) 06/30/2023    WBC 8.4 04/06/2023    HGB 12.2 06/30/2023    HGB 11.0 (L) 06/02/2023    HCT 36.0 06/30/2023    HCT 34.0 (L) 04/06/2023     06/30/2023     04/06/2023     BMP:   Lab Results   Component Value Date     06/18/2020    POTASSIUM 4.5 06/18/2020    CHLORIDE 104 06/18/2020    CO2 25 06/18/2020    BUN 14 06/18/2020    CR 0.89 06/18/2020    GLC 87 06/18/2020     COAGS: No results found for: PTT, INR, FIBR  POC: No results found for: BGM, HCG, HCGS  HEPATIC:   Lab Results   Component Value Date    ALBUMIN 4.0 06/18/2020    PROTTOTAL 7.3 06/18/2020    ALT 11 06/18/2020    AST 13 06/18/2020    ALKPHOS 48 06/18/2020    BILITOTAL 0.4 06/18/2020     OTHER:   Lab Results   Component Value Date    DI 9.6 06/18/2020    TSH 1.18 06/18/2020    CRP 0.4 06/18/2020    SED 13 06/18/2020       Anesthesia Plan    ASA Status:  2      Anesthesia Type: Epidural.              Consents    Anesthesia Plan(s) and associated risks, benefits, and realistic alternatives discussed. Questions answered and patient/representative(s) expressed understanding.     - Discussed: Risks,  Benefits and Alternatives for the PROCEDURE were discussed     - Discussed with:  Patient         Postoperative Care            Comments:                Mike Hatch MD

## 2023-06-30 NOTE — PROVIDER NOTIFICATION
23 0813   Provider Notification   Provider Name/Title JAHAIRA Medina   Method of Notification Phone   Request Evaluate - Remote   Notification Reason Patient Arrived;Membrane Status;Uterine Activity;Pain;Status Update   JAHAIRA Medina called with patient arrival.  at 40.2 wks here for elective induction and SROM. Patient reports leaking of clear to yellow fluid around 0550. Prenatal history reviewed. GBS negative. FHR strip cat.1 tracing with regular contractions every 3-4 min apart, mild to mod.with palpation. VSS. Perineum appears wet but no active leaking present. ROM collected and waiting order to send. TORB by JAHAIRA Medina for intrapartum order set reviewed, 100 mcg IV fentanyl ok, epidural ok and nitrous oxide ok, T&S, CBC with platelets, no treponema and send ROM. JAHAIRA Medina in route to hospital, ETA 15 min. POC discussed with patient and FOB.

## 2023-06-30 NOTE — PROVIDER NOTIFICATION
23 1334   Provider Notification   Provider Name/Title JAHAIRA Espinal   Method of Notification At Bedside   Request Evaluate in Person   Notification Reason Pain;Uterine Activity;SVE;Status Update     JAHAIRA Espinal at the bedside to evaluate labor progression. Patient comfortable with epidural. Alejo catheter placed and draining. VSS. Afebrile. SVE 8/100/0 with forebag present, AROM with meconium fluid present.  team will be attending delivery. Repositioned to RL and using peanut ball. FHR strip reviewed from the bedside. Patient aware to notify staff if feeling urge to push or consistent rectal pressure, patient verbalized understanding. JAHAIRA Medina will re-evaluate in 2-4 hours or sooner if needed.

## 2023-06-30 NOTE — H&P
JAHAIRA Labor Admission History & Physical    Arcelia Andersen is a 28 year old  with an IUP at 40w2d  ; ,   Partner/support Person: Gadiel  Language Barrier: English  Clinic: [facility]  Provider: MARIA M Cardoza CNM      Arcelia Andersen is admitted to the [facility] at [facility] on 2023 at 8:41 AM       History of present inllness/Chief Complaint:    Arcelia presents for elective induction of labor. Upon admission, she reports scant leaking of yellowish fluid since 545 and contractions every 2-5 mins.     Here with: induction of labor secondary to elective, possible early labor and SROM  Patient reports contractions are Regular           Baby active Yes  Membranes are ruptured since 545 and verified by positive pool, visualized yellowish fluid  Bloody show No   Any changes with medical history since last prenatal visit No  Declines syphilis screening.      Obstetrical history  Estimated Date of Delivery: 2023 determined by first trimester US consistent with LMP  Patient's last menstrual period was 2022 (exact date).   Dating U/S:     Fetal anatomic survey: Normal  Placenta: anterior     PRENATAL COURSE  Prenatal care began at 10 wks gestation for a total of 13 prenatal visits.  Total wt gain 37; Body mass index is 30.07 kg/m .  Prenatal Blood Pressure: WNL  Prenatal course was   complicated by    Patient Active Problem List    Diagnosis Date Noted     Indication for care in labor or delivery 2023     Priority: Medium     Depression during pregnancy, antepartum 2022     Priority: Medium     - Stable on Wellbutrin x5 yrs  - Does not see a therapist       Encounter for supervision of normal first pregnancy in first trimester 2022     Priority: Medium       Clinic/Hospital: [facility]  Partner Name: Gadiel  Ultrasound predicts sex:   Childrens names/ages:   Previous labor experiences:   Waterbirth (interest, declined, ineligible):   Level of  education/occupation: NICU RN at [employer]  Pertinent History:   PP contraception:   Hx PPD/Depression/Anxiety: yes, depression, stable on Wellbutrin x5yrs  Birth Ed:   Plans for labor pain management: Planning to use pain interventions as needed, would like to avoid epidural.   Plans for post partum recovery/time off:   Feeding preference: Breast  Breast pump:   Peds provider:    Car seat:   Circumcision:   Discussed 2 week visit:   Tdap:   Flu:   Covid vaccine:          Anxiety 12/29/2020     Priority: Medium     Created by Conversion  Replacement Utility updated for latest IMO load      Formatting of this note might be different from the original.  Created by Conversion    Replacement Utility updated for latest IMO load  Formatting of this note might be different from the original.  Created by Conversion    Replacement Utility updated for latest IMO load       Anemia 11/01/2019     Priority: Medium     Created by Conversion      Formatting of this note might be different from the original.  Created by Conversion  Formatting of this note might be different from the original.  Created by Conversion  Created by Conversion  Formatting of this note might be different from the original.  Created by Conversion       Mild recurrent major depression (H) 09/07/2017     Priority: Medium     Hematuria      Priority: Medium     Created by Conversion      Formatting of this note might be different from the original.  Created by Conversion       Tdap: 4/6/23  Rhogam: not indicated     Patient Active Problem List   Diagnosis     Anxiety     Anemia     Hematuria     Mild recurrent major depression (H)     Depression during pregnancy, antepartum     Encounter for supervision of normal first pregnancy in first trimester     Indication for care in labor or delivery       HISTORY  Allergies   Allergen Reactions     Tree Nut [Nuts] Hives, Itching and Shortness Of Breath     Nuts - Unspecified [Nuts] Hives and Itching     Annotation:  tree nuts cause throat swelling       Past Medical History:   Diagnosis Date     Anemia      Depressive disorder      Major depression, single episode 2019    Created by Conversion   Formatting of this note might be different from the original. Created by Conversion Formatting of this note might be different from the original. Created by Conversion Created by Conversion Formatting of this note might be different from the original. Created by Conversion     Urinary tract infection      Varicella      No past surgical history on file.  Family History   Problem Relation Age of Onset     No Known Problems Mother      Asthma Father      Depression Father      Kidney Disease Father      Depression Sister      Depression Sister      No Known Problems Brother      Diabetes Maternal Grandfather      Brain Cancer Paternal Grandmother      Depression Paternal Grandfather      Depression Paternal Aunt         several     Hypertension Other         family history of this     Social History     Tobacco Use     Smoking status: Never     Smokeless tobacco: Never   Substance Use Topics     Alcohol use: Not Currently     Comment: Alcoholic Drinks/day: occasionally once a month     OB History    Para Term  AB Living   1 0 0 0 0 0   SAB IAB Ectopic Multiple Live Births   0 0 0 0 0      # Outcome Date GA Lbr Juan/2nd Weight Sex Delivery Anes PTL Lv   1 Current                LABS:  Lab Results   Component Value Date    AS Negative 2022    HGB 11.0 (L) 2023    HEPBANG Nonreactive 2022    CHPCRT Negative 2022    GCPCRT Negative 2022       GBS Status:   No results found for: GBS  Rubella: Immune    HIV: Non-Reactive   Platelets:  206  1hr GCT:  85    ROS   Pt is alert and oriented  Pt denies significant constitutional symptoms including fever and/or malaise.    Pt denies significant respiratory, cardiovacular, GI, or muscular/skeletal complaints.    Neuro: Denies HA and visual  "changes  Muscoloskeletal: Denies except for discomforts r/t pregnancy     PHYSICAL EXAM:  /74 (Cuff Size: Adult Regular)   Temp 98.6  F (37  C) (Oral)   Resp 18   Ht 1.702 m (5' 7\")   Wt 87.1 kg (192 lb)   LMP 2022 (Exact Date)   BMI 30.07 kg/m    General appearance:  healthy, alert and active   Heart: RRR  Lungs: CTA bilaterally, normal respiratory effort  Abdomen: gravid, single vertex fetus, non-tender, EFW AGA  Legs: reflexes 2+ bilaterally, no clonus, no edema     Contractions: Pt is rossana every 2-5 minutes, lasting 60 seconds and palpates moderate    Fetal heart tones: Baseline 130   Variability: moderate   Accelerations: present  Decelerations: absent    NST: reactive    Cervix:deferred, Vtx  3/60/-1 in clinic on   Bloody show: no  Membranes:  Ruptured, confirmed with pooling and ROM+, scant yellow meconium fluid    ASSESSMENT:  28 year old  with turner IUP 40w2d for early labor with SROM  NST reactive  GBS negative and membranes ruptured for 3 hours, afebrile   Meconium stained amniotic fluid     PLAN:  Routine CNM care  Labs ordered: Hemoglobin and type and screen    NICU at delivery  Discussed option for pitocin augmentation now or wait and see if contractions continue to get stronger/closer together on their own since she is now ruptured. Discussed infection risks with prolonged ROM and deferred cervical exam at this point. Arcelia and Gadiel desire expectant management for now. Encouraged ambulation, upright positioning     Teaching done r/t comfort measures, pain management options, and labor processes, and Arcelia is open to nitrous, fentanyl, and epidural as needed   Admit - see IP orders  Anticipate   Reassess in 2-4hrs/prn    Carol Clemente CNM  "

## 2023-06-30 NOTE — PROVIDER NOTIFICATION
06/30/23 1615   Provider Notification   Provider Name/Title JAHAIRA Espinal   Method of Notification At Bedside   Request Evaluate in Person   Notification Reason Pain;Uterine Activity;Status Update;SVE     JAHAIRA Espinal at the bedside to evaluate labor progression. Patient intermittently reporting vaginal pressure with each contractions, nothing painful. Patient agreeable with cervical examination. SVE 10/100/+2. Options discussed to start pushing now or labor down. Patient agreeable to start pushing now. Pitocin now at 6 units. VSS. Afebrile. FHR strip reviewed from the bedside. Patient will get set up to start pushing. FOB at bedside and supportive.

## 2023-06-30 NOTE — PROVIDER NOTIFICATION
06/30/23 1105   Provider Notification   Provider Name/Title JAHAIRA Medina   Method of Notification At Bedside   Request Evaluate in Person   Notification Reason Uterine Activity;Pain;SVE;Status Update   JAHAIRA Medina at the bedside to evaluate labor progression. Patient now reporting increasing pain associated with contractions. Patient is currently on the birthing ball, using nitrous oxide and reporting little change with pain. Patient is now requesting to try IV fentanyl. Contractions regular, palpating moderate and patient is needing to breath through contractions. FHR strip reviewed. Lori monitors working well. FOB at bedside and supportive. SVE 4/75/0. No need for pitocin at this time. Carol will re-assess in 2-4 hours.

## 2023-06-30 NOTE — PROVIDER NOTIFICATION
06/30/23 1400   Provider Notification   Provider Name/Title JAHAIRA Medina   Method of Notification In Department   Request Evaluate - Remote   Notification Reason Uterine Activity     JAHAIRA Medina in the department and updated. Uterine contractions have spaced since forebag rupture, recommend start pitocin at 2 units, JAHAIRA Medina agrees to start pitocin augmentation. FHR strip reviewed from the L&D desk. POC discussed with patient and FOB.

## 2023-06-30 NOTE — PROVIDER NOTIFICATION
06/30/23 0851   Provider Notification   Provider Name/Title JAHAIRA Medina   Method of Notification At Bedside   Request Evaluate in Person   Notification Reason Membrane Status;Uterine Activity;Pain   JAHAIRA Medina at the bedside to discuss plan of care. ROM plus positive. Yellowish to clear fluid noted, light meconium. FHR strip reviewed. Discussed IA however light meconium fluid is contraindicated, will consider jenna monitors. Plan to wait and see and re-evaluate around 11 am to determine if pitocin is warranted. Encouraged frequent position changes. Patient and FOB agreeable with plan of care.

## 2023-06-30 NOTE — PROGRESS NOTES
"CNM PROGRESS NOTE    SUBJECTIVE:  Is comfortable after epidural placement. She received 100 mcg of IV fentanyl at 1119, with some relief and  Developed nausea and vomiting. Indwelling urinary catheter is in place.     OBJECTIVE:  /59   Temp 97.5  F (36.4  C) (Oral)   Resp 16   Ht 1.702 m (5' 7\")   Wt 87.1 kg (192 lb)   LMP 2022 (Exact Date)   SpO2 100%   BMI 30.07 kg/m      Fetal heart tones: Baseline 120   Variability: moderate   Accelerations: present  Decelerations: absent    Contractions: Pt is rossana every 1-6 minutes, lasting  seconds     Cervix: 8/ 100% / 0, Vtx  ROM: light meconium fluid     Pitocin- none  Antibiotics- none  Cervical ripening: N/A  Labor events:     Cervix 3cm / 60% / -1,  clinic visit     23  0545 - SROM at home, yellow fluid  0730 - admission to hospital, SROM w/ light mec confirmed, in early labor with regular contractions, cervical exam deferred   1100 - cervix 4cm / 75% / 0, mid, soft, forebag felt. Contractions picking up  1334- cervix 8/100/0, anterior, soft, forebag ruptured after discussing with the patient the risk and benefits    ASSESSMENT:  IUP @ 40w2d active labor, contractions spacing out.  GBS- negative  Category 1 tracing   SROM- light meconium, ruptured 8 hrs, afebrile     PLAN:   - Start pitocin for labor augmentation   -Anticipate   -Comfort measures and repositioning prn  -Encouraged to rest  -Reevaluate in 2-4 hours/PRN    Geetha CORTEZ on 2023 at 1:59 PM    MARIA M Cardoza, JAHAIRA    I was present with the student who participated in the service and documentation of the services provided. I have verified the history and personally performed the physical exam and medical decision making as documented by the student and edited by me.    "

## 2023-06-30 NOTE — PROGRESS NOTES
"CNM PROGRESS NOTE    SUBJECTIVE:  Arcelia was able to get some rest. Current side-lying on her right. States she has rectal pressure with contractions but no urge to push.     OBJECTIVE:  /85   Temp 98.4  F (36.9  C) (Oral)   Resp 16   Ht 1.702 m (5' 7\")   Wt 87.1 kg (192 lb)   LMP 2022 (Exact Date)   SpO2 100%   BMI 30.07 kg/m      Fetal heart tones: Baseline 125   Variability: moderate  Accelerations: present  Decelerations: absent    Contractions: Pt is rossana every 2-4 minutes, lasting 60 seconds and palpates moderate    Cervix: 10/ 100% / +2, Vtx  ROM: light meconium fluid     Pitocin- 6 mu/min.  Antibiotics- none  Cervical ripening: N/A    Labor events:     Cervix 3cm / 60% / -1,  clinic visit     23  0545 - SROM at home, yellow fluid  0730 - admission to hospital, SROM w/ light mec confirmed, in early labor with regular contractions, cervical exam deferred   1100 - cervix 4cm / 75% / 0, mid, soft, forebag felt. Contractions picking up  1334- cervix 8/100/0, anterior, soft, forebag ruptured after discussing with the patient the risk and benefits  1615 - cervix 10/100/+2  1635 - begin pushing     ASSESSMENT:  IUP @ 40w2d active labor and good progress   GBS- negative  SROM 10hr 53min, light meconium, pt afebrile  Reassuring fetal status      PLAN:   Discussed risks/benefits of beginning pushing now versus 1hr of laboring down. Pt desires to begin pushing. Several trial pushes done with adequate fetal descent. Pt desires to continue pushing.   Facilitate position changes to promote optimal fetal positioning/descent   NICU at delivery for MSAF  Anticipate     Carol Clemente CNM    "

## 2023-06-30 NOTE — L&D DELIVERY NOTE
OB Vaginal Delivery Note    Arcelia Andersen MRN# [id number]   Age: 28 year old YOB: 1995       GA: 40w2d  GP:   Labor Complications: None   EBL:   mL  Delivery QBL: 366 mL  Delivery Type: Vaginal, Spontaneous   ROM to Delivery Time: (Delivered) Hours: 11 Minutes: 20  [place] Weight:     1 Minute 5 Minute 10 Minute   Apgar Totals: 7   9        CHAD, MECCA SAVAGE;MARTI GRAHAM      Delivery Note    IUP at 40 weeks gestation delivered on 2023.     delivery of a viable  Female infant.  Apgars of 7 at 1 minute and 9 at 5 minutes.  Labor was augmented.    Medications administered  in labor:  Pain Rx IV fentanyl , Epidural and nitrous oxide; Antibiotics No;  Perineum: Bilateral labial laceration-repaired, delayed cord clamping Yes for 5 minutes  Placenta-mechanism: spontaneous, intact,  with a 3 vessel cord. IV oxytocin was given.  Estimated Blood Loss:  366 cc's  Complications of pregnancy, labor and delivery: None      Delivery Details:  Arcelia Andersen, a 28 year old  female delivered a viable infant with apgars of 7  and 9 . Patient was fully dilated and pushing after   hours   minutes in active labor. Delivery was via vaginal, spontaneous  to a sterile field under epidural  anesthesia. Infant delivered in vertex  right  occiput  anterior  position. Anterior and posterior shoulders delivered without difficulty. The cord was clamped, cut twice and 3 vessels  were noted. Cord blood was obtained in routine fashion with the following disposition: discard .      Cord complications: none   Placenta delivered at 2023  5:18 PM . Placental disposition was Hospital disposal . Fundal massage performed and fundus found to be firm.     Episiotomy: none    Perineum, vagina, cervix were inspected, and the following lacerations were noted:   Perineal lacerations: none     labial laceration: bilateral           Any lacerations were repaired in the usual fashion using 3-0 and 4-0  vicryl.    Excellent hemostasis was noted. Needle count correct. Infant and patient in delivery room in good and stable condition.        Nevaeh Andersen-Arcelia [[id number]]    Labor Event Times    Latent labor onset date/time: 2023 1039    Active labor onset date: 23 Onset time:  1:34 PM CDT   Dilation complete date: 23 Complete time:  4:15 PM   Start pushing date/time: 2023 1628      Labor Events     labor?: No   steroids: None  Labor Type: Spontaneous  Predominate monitoring during 1st stage: continuous electronic fetal monitoring     Antibiotics received during labor?: No     Rupture date/time: 23 0550   Rupture type: Spontaneous Rupture of Membranes  Fluid color: Meconium  Fluid odor: Normal     Induction date/time:      Cervical ripening date/time:      Indications for induction: Prelabor ROM     Augmentation: Oxytocin  Indications for augmentation: Ineffective Contraction Pattern  Additional Management: forebag ruptured at 1334 with mec.fluid present     Delivery/Placenta Date and Time    Delivery Date: 23 Delivery Time:  5:10 PM   Placenta Date/Time: 2023  5:18 PM  Oxytocin given at the time of delivery: after delivery of baby  Delivering clinician: Carol Clemente CNM   Other personnel present at delivery:  Provider Role   Brittney Rose, RN Delivery Nurse   Isabella Moeller RN Registered Nurse         Vaginal Counts     Initial count performed by 2 team members:  Two Team Members   JAHAIRA SRINIVASAN RN       Needles Suture Needles Sponges (RETIRED) Instruments   Initial counts 2  5    Added to count  2 5    Relief counts       Final counts 2 2 10          Placed during labor Accounted for at the end of labor   FSE NA NA   IUPC NA NA   Cervidil NA NA              Final count performed by 2 team members:  Two Team Members   JAHAIRA SRINIVASAN RN      Final count correct?: Yes  Pre-Birth Team Brief: Complete     Apgars    Living status: Living   1 Minute 5  Minute 10 Minute 15 Minute 20 Minute   Skin color: 0  1       Heart rate: 2  2       Reflex irritability: 2  2       Muscle tone: 1  2       Respiratory effort: 2  2       Total: 7  9       Apgars assigned by: BRITTNEY BONILLA RN     Cord    Vessels: 3 Vessels    Cord Complications: None               Cord Blood Disposition: Discard    Gases Sent?: No    Stem cell collection?: No        Resuscitation    Methods: None  Output in Delivery Room: Voided, Stool      Measurements    Output in delivery room: Voided, Stool     Skin to Skin and Feeding Plan    Skin to skin initiation date/time: 1841    Skin to skin with: Mother  Skin to skin end date/time:     Breastfeeding initiated date/time: 2023 1725     Labor Events and Shoulder Dystocia    Fetal Tracing Prior to Delivery: Category 1  Shoulder dystocia present?: Neg     Delivery (Maternal) (Provider to Complete) (648902)    Episiotomy: None  Perineal lacerations: None Repaired?: No   Labial laceration: bilateral Repaired?: Yes   Repair suture: 3-0 Vicryl, 4-0 Vicryl  Genital tract inspection done: Pos     Blood Loss  Mother: Arcelia Andersen R #[id number]   Start of Mother's Information    Delivery Blood Loss  23 1334 - 23 1823    Delivery QBL (mL) Hospital Encounter 366 mL    Total  366 mL         End of Mother's Information  Mother: Arcelia Andersen #[id number]          Delivery - Provider to Complete ([id number])    Delivering clinician: Carol Clemente CNM  Delivery Type (Choose the 1 that will go to the Birth History): Vaginal, Spontaneous                   Other personnel:  Provider Role   Brittney Bonilla, RN Delivery Nurse   Isabella Moeller RN Registered Nurse                Placenta    Date/Time: 2023  5:18 PM  Removal: Spontaneous  Disposition: Hospital disposal           Anesthesia    Method: Nitrous Oxide, INTRAVENOUS , Epidural  Cervical dilation at placement: 4-7                Presentation and Position    Presentation:  Vertex    Position: Right Occiput Anterior                 Geetha Jude SNM on 6/30/2023 at 6:22 PM    MARIA M Cardoza, JAHAIRA    I was present with the student who participated in the service and documentation of the services provided. I have verified the history and personally performed the physical exam and medical decision making as documented by the student and edited by me.

## 2023-06-30 NOTE — PROVIDER NOTIFICATION
06/30/23 1308   Provider Notification   Provider Name/Title JAHAIRA Medina   Method of Notification Electronic Page   Request Evaluate - Remote   Notification Reason Pain;Uterine Activity;Status Update     JAHAIRA Medina web based paged with an update. Comfortable with epidural. Ctx 4-6 min apart, FHR cat.1. VSS. Afebrile. Plan to place ro catheter at 1330 and if you wanted to re-check cervix and consider rupture of forebag and start pitocin. Thanks.

## 2023-06-30 NOTE — ANESTHESIA PROCEDURE NOTES
"Epidural catheter Procedure Note    Pre-Procedure   Staff -        Anesthesiologist:  Mike Htach MD       Performed By: anesthesiologist       Referred By: Bryn       Location: OB       Pre-Anesthestic Checklist: patient identified, IV checked, risks and benefits discussed, informed consent, monitors and equipment checked, pre-op evaluation, at physician/surgeon's request and post-op pain management  Timeout:       Correct Patient: Yes        Correct Procedure: Yes        Correct Site: Yes        Correct Position: Yes   Procedure Documentation  Procedure: epidural catheter       Patient Prep/Sterile Barriers: mask   Comments:  Patient desires Labor Epidural for labor analgesia. Vaginal delivery anticipated.    Chart reviewed. Patient examined. No changes to pre procedure chart review. Risks including but not limited to bleeding, infection, nerve injury, PDPH, intrathecal injection, high block, incomplete block, one-sided block, back pain, and low blood pressure discussed in detail. Questions answered. Consent signed.    Pause for the Cause completed. NIBP and pulse ox functioning. L&D nurse present.    Procedure: Sitting. Betadine prep x 3. Sterile drape applied.  Lidocaine 1% x 2 cc local infiltration at L 3-4.  17 G. Tu needle ML AARON 1 attempt.  No CSF, paresthesia or blood. 20 g. Epidural catheter inserted w/o resistance 5 cm.  Negative aspiration for CSF and blood. Filter in line.  Test dose Lidocaine 2% w/ 1:200,000 epi x 2.5 cc injected. Negative for neuro change or symptoms of intravascular injection.  Bolus dose: Marcaine 0.25% 5cc x 2 doses (10 cc total).  Infusion orders written.    I or my partner am immediately available. I or my partner will monitor the patient and supervise nursing care at necessary intervals.    JAKVarun       FOR [facility] ([facility]) ONLY:   Pain Team Contact information: please page the Pain Team Via [employer]. Search \"Pain\". During daytime hours, please page the " attending first. At night please page the resident first.

## 2023-06-30 NOTE — TELEPHONE ENCOUNTER
Form received from: Via fax for pt; Arcelia Andersen     Form requesting following info/need: STD forms for [employer]     AZALEA needed?: attached     Location of form: basket above Palmira's desk     When completed the route for return: please fax to number on form

## 2023-07-01 VITALS
TEMPERATURE: 98.5 F | DIASTOLIC BLOOD PRESSURE: 72 MMHG | WEIGHT: 192 LBS | BODY MASS INDEX: 30.13 KG/M2 | SYSTOLIC BLOOD PRESSURE: 127 MMHG | OXYGEN SATURATION: 100 % | HEIGHT: 67 IN | HEART RATE: 87 BPM | RESPIRATION RATE: 16 BRPM

## 2023-07-01 PROCEDURE — 250N000013 HC RX MED GY IP 250 OP 250 PS 637: Performed by: ADVANCED PRACTICE MIDWIFE

## 2023-07-01 RX ADMIN — IBUPROFEN 800 MG: 800 TABLET ORAL at 18:31

## 2023-07-01 RX ADMIN — CALCIUM CARBONATE (ANTACID) CHEW TAB 500 MG 1000 MG: 500 CHEW TAB at 00:05

## 2023-07-01 RX ADMIN — IBUPROFEN 800 MG: 800 TABLET ORAL at 00:05

## 2023-07-01 RX ADMIN — DOCUSATE SODIUM 100 MG: 100 CAPSULE, LIQUID FILLED ORAL at 07:42

## 2023-07-01 RX ADMIN — DOCUSATE SODIUM 100 MG: 100 CAPSULE, LIQUID FILLED ORAL at 00:05

## 2023-07-01 RX ADMIN — BUPROPION HYDROCHLORIDE 150 MG: 150 TABLET, EXTENDED RELEASE ORAL at 07:42

## 2023-07-01 RX ADMIN — PRENATAL VITAMINS-IRON FUMARATE 27 MG IRON-FOLIC ACID 0.8 MG TABLET 1 TABLET: at 07:42

## 2023-07-01 RX ADMIN — IBUPROFEN 800 MG: 800 TABLET ORAL at 12:11

## 2023-07-01 RX ADMIN — FAMOTIDINE 20 MG: 20 TABLET ORAL at 07:42

## 2023-07-01 RX ADMIN — ACETAMINOPHEN 650 MG: 325 TABLET, FILM COATED ORAL at 07:40

## 2023-07-01 RX ADMIN — IBUPROFEN 800 MG: 800 TABLET ORAL at 06:06

## 2023-07-01 ASSESSMENT — ACTIVITIES OF DAILY LIVING (ADL)
ADLS_ACUITY_SCORE: 18

## 2023-07-01 NOTE — PLAN OF CARE
Room orientation completed: use of call light, basic infant care, use of bulb syringe, safe sleep techniques, fall prevention, plan of care. 4 part ID Bracelets checked and verified.    Vitals and fundal checks stable. Using tucks/ice for laceration. Pain meds given with pain reassessment 1 hour after. Voiding without difficulty. No nausea. Ambulating independently. Appropriate bonding/cares for baby. Breast feeding. Cluster fed overnight. Education completed with all cares.

## 2023-07-01 NOTE — DISCHARGE INSTRUCTIONS
Postpartum Vaginal Delivery Instructions    Activity     Ask family and friends for help when you need it.  Do not place anything in your vagina for 6 weeks.  You are not restricted on other activities, but take it easy for a few weeks to allow your body to recover from delivery.  You are able to do any activities you feel up to that point.  No driving until you have stopped taking your pain medications (usually two weeks after delivery).     Call your health care provider if you have any of these symptoms:     Increased pain, swelling, redness, or fluid around your stiches from an episiotomy or perineal tear.  A fever above 100.4 F (38 C) with or without chills when placing a thermometer under your tongue.  You soak a sanitary pad with blood within 1 hour, or you see blood clots larger than a golf ball.  Bleeding that lasts more than 6 weeks.  Vaginal discharge that smells bad.  Severe pain, cramping or tenderness in your lower belly area.  A need to urinate more frequently (use the toilet more often), more urgently (use the toilet very quickly), or it burns when you urinate.  Nausea and vomiting.  Redness, swelling or pain around a vein in your leg.  Problems breastfeeding or a red or painful area on your breast.  Chest pain and cough or are gasping for air.  Problems coping with sadness, anxiety, or depression.  If you have any concerns about hurting yourself or the baby, call your provider immediately.   You have questions or concerns after you return home.     Keep your hands clean:  Always wash your hands before touching your perineal area and stitches.  This helps reduce your risk of infection.  If your hands aren't dirty, you may use an alcohol hand-rub to clean your hands. Keep your nails clean and short.    Warning Signs after Having a Baby    Keep this paper on your fridge or somewhere else where you can see it.    Call your provider if you have any of these symptoms up to 12 weeks after having your  baby.    Thoughts of hurting yourself or your baby  Pain in your chest or trouble breathing  Severe headache not helped by pain medicine  Eyesight concerns (blurry vision, seeing spots or flashes of light, other changes to eyesight)  Fainting, shaking or other signs of a seizure    Call 9-1-1 if you feel that it is an emergency.     The symptoms below can happen to anyone after giving birth. They can be very serious. Call your provider if you have any of these warning signs.    My provider s phone number: _______________________    Losing too much blood (hemorrhage)    Call your provider if you soak through a pad in less than an hour or pass blood clots bigger than a golf ball. These may be signs that you are bleeding too much.    Blood clots in the legs or lungs    After you give birth, your body naturally clots its blood to help prevent blood loss. Sometimes this increased clotting can happen in other areas of the body, like the legs or lungs. This can block your blood flow and be very dangerous.     Call your provider if you:  Have a red, swollen spot on the back of your leg that is warm or painful when you touch it.   Are coughing up blood.     Infection    Call your provider if you have any of these symptoms:  Fever of 100.4 F (38 C) or higher.  Pain or redness around your stitches if you had an incision.   Any yellow, white, or green fluid coming from places where you had stitches or surgery.    Mood Problems (postpartum depression)    Many people feel sad or have mood changes after having a baby. But for some people, these mood swings are worse.     Call your provider right away if you feel so anxious or nervous that you can't care for yourself or your baby.    Preeclampsia (high blood pressure)    Even if you didn't have high blood pressure when you were pregnant, you are at risk for the high blood pressure disease called preeclampsia. This risk can last up to 12 weeks after giving birth.     Call your  provider if you have:   Pain on your right side under your rib cage  Sudden swelling in the hands and face    Remember: You know your body. If something doesn't feel right, get medical help.     For informational purposes only. Not to replace the advice of your health care provider. Copyright 2020 [employer]. All rights reserved. Clinically reviewed by Adwoa Kidd, RNC-OB, MSN. [email or web address] [id number] - Rev 02/23.

## 2023-07-01 NOTE — PLAN OF CARE
Vital signs stable. Postpartum assessment WDL. Pain well controlled. Up ad/adali. Voiding w/o difficulty. Breastfeeding well. PPD screen=1. Turned in birth certificate. Awaiting possible discharge this evening if newborns 24 hour test results come back WNL. Will continue to monitor. Questions/concerns addressed.

## 2023-07-01 NOTE — ANESTHESIA POSTPROCEDURE EVALUATION
Patient: Arcelia Andersen    Procedure: * No procedures listed *       Anesthesia Type:  Epidural    Note:     Postop Pain Control:    PONV:    Neuro/Psych:    Airway/Respiratory:    CV/Hemodynamics:    Other NRE:    DID A NON-ROUTINE EVENT OCCUR?     Event details/Postop Comments:      S/P epidural for labor.   I or my partner was immediately available for management of this patient during epidural analgesia infusion.  VSS.  Doing well. Block resolved.  Neuro at baseline. Denies positional headache. Minimal side effects easily managed w/ PRN meds. No apparent anesthetic complications. No follow-up required.    Marlena Tran MD             Last vitals:  Vitals:    07/01/23 0338 07/01/23 0757 07/01/23 1200   BP: 119/57 128/60 118/48   Pulse: 85 95 74   Resp: 16 16 16   Temp: 98.1  F (36.7  C) 98.1  F (36.7  C) 98.1  F (36.7  C)   SpO2:          Electronically Signed By: Marlena Tran MD  July 1, 2023  2:00 PM

## 2023-07-01 NOTE — DISCHARGE SUMMARY
CNM Postpartum Discharge Note    SIGNIFICANT PROBLEMS:  Patient Active Problem List    Diagnosis Date Noted     Indication for care in labor or delivery 06/30/2023     Priority: Medium     Depression during pregnancy, antepartum 12/05/2022     Priority: Medium     - Stable on Wellbutrin x5 yrs  - Does not see a therapist       Encounter for supervision of normal first pregnancy in first trimester 12/05/2022     Priority: Medium       Clinic/Hospital: [facility]  Partner Name: Gadiel  Ultrasound predicts sex:   Childrens names/ages:   Previous labor experiences:   Waterbirth (interest, declined, ineligible):   Level of education/occupation: NICU RN at [employer]  Pertinent History:   PP contraception:   Hx PPD/Depression/Anxiety: yes, depression, stable on Wellbutrin x5yrs  Birth Ed:   Plans for labor pain management: Planning to use pain interventions as needed, would like to avoid epidural.   Plans for post partum recovery/time off:   Feeding preference: Breast  Breast pump:   Peds provider:    Car seat:   Circumcision:   Discussed 2 week visit:   Tdap:   Flu:   Covid vaccine:          Anxiety 12/29/2020     Priority: Medium     Created by Conversion  Replacement Utility updated for latest IMO load      Formatting of this note might be different from the original.  Created by Conversion    Replacement Utility updated for latest IMO load  Formatting of this note might be different from the original.  Created by Conversion    Replacement Utility updated for latest IMO load       Anemia 11/01/2019     Priority: Medium     Created by Conversion      Formatting of this note might be different from the original.  Created by Conversion  Formatting of this note might be different from the original.  Created by Conversion  Created by Conversion  Formatting of this note might be different from the original.  Created by Conversion       Mild recurrent major depression (H) 09/07/2017     Priority: Medium     Hematuria      Priority:  "Medium     Created by Conversion      Formatting of this note might be different from the original.  Created by Conversion           SUBJECTIVE:  Patient is stable  and is tolerating acitivity well  Baby is rooming in  Complications since 2 hours post delivery: None  Pain is well controlled.  Patient is taking pain medications.  Breastfeeding status:initiated, well established and reports baby cluster fed overnight   Elimination:  She is voiding without difficulty.  She has not had a bowel movement  Desired contraception Condoms  Denies heavy bleeding and passing large clots.    INTERVAL HISTORY:  /60 (BP Location: Left arm, Patient Position: Semi-Roe's, Cuff Size: Adult Regular)   Pulse 95   Temp 98.1  F (36.7  C) (Oral)   Resp 16   Ht 1.702 m (5' 7\")   Wt 87.1 kg (192 lb)   LMP 09/21/2022 (Exact Date)   SpO2 100%   Breastfeeding Unknown   BMI 30.07 kg/m      Constitutional: healthy, alert and no distress    Breasts: Currently breastfeeding    Fundus: Uterine fundus is firm, non-tender and at 2 cm below the level of the umbilicus    Perineum: Perineum is intact and/or well approximated, minimal swelling    Lochia: Lochia is appropriate for the duration of time since delivery.     Postpartum hemoglobin   Hemoglobin   Date Value Ref Range Status   06/30/2023 12.2 11.7 - 15.7 g/dL Final     Blood type No results found for: ABO  No results found for: RH  Rubella status No results found for: RUBELLAABIGG  History of depression:  yes , she is taking Wellbutrin 150 mg once a day    ASSESSMENT/PLAN:  Normal postpartum course  Stable Post-partum day #1  Complications:none  Postpartum warning s/s reviewed, including bleeding/clots, fever, mastitis & thromboemboli   Exercise, diet and rest reviewed  PP Jaqueline/radha reviewed  Continue prenatal vitamins while breastfeeding  Birthcontrol planned:Condoms  Educated on postpartum blues and postpartum depression warnings signs/symptoms  Advised patient to not " use unisom while breastfeeding   2 week phone call follow-up to be done by delivering CNM  Follow-up in 6 weeks with CNMs at [facility]  Plan d/c home today evening    Current Discharge Medication List      CONTINUE these medications which have NOT CHANGED    Details   buPROPion (WELLBUTRIN XL) 150 MG 24 hr tablet Take 1 tablet (150 mg) by mouth every morning  Qty: 90 tablet, Refills: 3    Associated Diagnoses: Major depressive disorder with single episode, remission status unspecified      calcium carbonate (TUMS) 500 MG chewable tablet       doxylamine (UNISOM) 25 MG TABS tablet Take 25 mg by mouth At Bedtime      Famotidine (PEPCID PO)       ondansetron (ZOFRAN ODT) 4 MG ODT tab Take 1 tablet (4 mg) by mouth every 8 hours as needed for nausea  Qty: 30 tablet, Refills: 4      polyethylene glycol (MIRALAX) 17 GM/Dose powder Take 1 capful by mouth daily PRN      Prenatal Vit-Fe Fumarate-FA (PRENATAL VITAMIN) 27-0.8 MG TABS       Misc. Devices (BREAST PUMP) MISC 1 each as needed (breast pump)  Qty: 1 each, Refills: 0    Associated Diagnoses: Encounter for supervision of normal first pregnancy in second trimester             DANA De Dios    I was present with the student who participated in the service and documentation of the services provided. I have verified the history and personally performed the physical exam and medical decision making as documented by the student and edited by me.  MARIA M Vallejo, JAHAIRA 7/1/2023 10:11 AM     Statement Selected

## 2023-07-02 NOTE — PLAN OF CARE
Discharge instructions completed.  Patient states she understands all discharge instructions and all her questions have been answered.  Verbalizes when she needs to return to clinic for follow up for herself and baby.  She is caring for herself and her baby independently.  Prescriptions reviewed and will  over the counter.  Postpartum depression symptoms reviewed and encouraged frequent review of depression scale. Patient discharged at 2015, accompanied by RN.     Goal Outcome Evaluation:      Plan of Care Reviewed With: patient, spouse    Overall Patient Progress: improvingOverall Patient Progress: improving

## 2023-07-05 NOTE — TELEPHONE ENCOUNTER
Paper work signed, faxed, copy sent to scan and placed in Carol's basket       Princess Obrien CMA on 7/5/2023 at 2:41 PM

## 2023-07-14 NOTE — PROGRESS NOTES
Arcelia Andersen is a 28 year old female who is being evaluated via a billable telephone visit.      What phone number would you like to be contacted at? [phone]  How would you like to obtain your AVS? Akhil      Midwife Postpartum 2 Week Visit    Arcelia Andersen is a 28 year old      This visit took place over the phone    Delivery date was 2023. She had a Vaginal, Spontaneous  delivery of a viable girl, 40w2d named Rylee, weight 8 pounds 3 oz., with complications of  None .    Information for the patient's :  Sagstetter, Rylee R [[id number]]   8 lbs 3.92 oz      APGARs 7 , 9     Since delivery, she has been exclusively breastfeeding.  She has not had any signs of infection, she describes her lochia after 2 weeks as red blood, lighter than a typical period with short times where it is as heavy as a regular period. She has not had other complications.     She is voiding and having bowel movements without difficulty. Has been taking stool softener PRN.      Contraception was discussed and patient desires condoms.   She  has not had intercourse since delivery. She complains of mild  perineal discomfort. Taking Ibuprofen as needed and encouraged the use of tub baths up to 3 times per day.     Mood is Stable. She is taking her Wellbutrin. Does not see a therapist regularly but aware this is available if her mood is declining. PHQ-9 done and results WNL.       ROS:  12 point review of systems negative other than symptoms noted below or in the HPI.       Current Outpatient Medications:      buPROPion (WELLBUTRIN XL) 150 MG 24 hr tablet, Take 1 tablet (150 mg) by mouth every morning, Disp: 90 tablet, Rfl: 3     polyethylene glycol (MIRALAX) 17 GM/Dose powder, Take 1 capful by mouth daily PRN, Disp: , Rfl:      Misc. Devices (BREAST PUMP) MISC, 1 each as needed (breast pump) (Patient not taking: Reported on 2023), Disp: 1 each, Rfl: 0.   OB History    Para Term  AB Living    1 1 1 0 0 1   SAB IAB Ectopic Multiple Live Births   0 0 0 0 1      # Outcome Date GA Lbr Juan/2nd Weight Sex Delivery Anes PTL Lv   1 Term 06/30/23 40w2d 02:41 / 00:55 3.74 kg (8 lb 3.9 oz) F Vag-Spont Nitrous, IV, EPI N TRUPTI      Name: CULLENFEMALE-CHETNA      Apgar1: 7  Apgar5: 9     Last pap:  No results found for: PAP  Hgb in hospital was 12.2    EXAM:  LMP 09/21/2022 (Exact Date)   Breastfeeding Yes   BMI: There is no height or weight on file to calculate BMI.  No physical exam as this is a virtual visit.     ASSESSMENT:   Normal postpartum exam after  Vaginal, Spontaneous .    ICD-10-CM    1. Routine postpartum follow-up  Z39.2       2. Mild recurrent major depression (H)  F33.0             PLAN:  No results found for any visits on 07/17/23.    Teaching: exercise, birth control, pain management, and mood.   Family Planning:condoms  Encourage rest with limited physical activity until at least 3 weeks PP.   Continue a multivitamin/prenatal supplement, especially if breastfeeding.  Postpartum Hgb was not done today.    GDM:  Fasting and 2hr GCT needed:  No    Return at 6 weeks postpartum for physical and pelvic exam.    25 minutes spent by me on the date of the encounter doing chart review, review of test results, patient visit and documentation     Delfina Allred CNM

## 2023-07-17 ENCOUNTER — VIRTUAL VISIT (OUTPATIENT)
Dept: MIDWIFE SERVICES | Facility: CLINIC | Age: 28
End: 2023-07-17
Payer: COMMERCIAL

## 2023-07-17 DIAGNOSIS — F33.0 MILD RECURRENT MAJOR DEPRESSION (H): ICD-10-CM

## 2023-07-17 PROCEDURE — 99213 OFFICE O/P EST LOW 20 MIN: CPT | Mod: 95 | Performed by: ADVANCED PRACTICE MIDWIFE

## 2023-07-17 ASSESSMENT — EDINBURGH POSTNATAL DEPRESSION SCALE (EPDS)
I HAVE BEEN SO UNHAPPY THAT I HAVE BEEN CRYING: NO, NEVER
THE THOUGHT OF HARMING MYSELF HAS OCCURRED TO ME: NEVER
I HAVE FELT SCARED OR PANICKY FOR NO GOOD REASON: NO, NOT AT ALL
I HAVE FELT SAD OR MISERABLE: NO, NOT AT ALL
TOTAL SCORE: 3
THINGS HAVE BEEN GETTING ON TOP OF ME: NO, MOST OF THE TIME I HAVE COPED QUITE WELL
I HAVE LOOKED FORWARD WITH ENJOYMENT TO THINGS: AS MUCH AS I EVER DID
I HAVE BEEN ANXIOUS OR WORRIED FOR NO GOOD REASON: YES, SOMETIMES
I HAVE BEEN ABLE TO LAUGH AND SEE THE FUNNY SIDE OF THINGS: AS MUCH AS I ALWAYS COULD
I HAVE BEEN SO UNHAPPY THAT I HAVE HAD DIFFICULTY SLEEPING: NOT AT ALL
I HAVE BLAMED MYSELF UNNECESSARILY WHEN THINGS WENT WRONG: NO, NEVER

## 2023-08-13 ASSESSMENT — EDINBURGH POSTNATAL DEPRESSION SCALE (EPDS)
I HAVE BEEN SO UNHAPPY THAT I HAVE HAD DIFFICULTY SLEEPING: NOT AT ALL
I HAVE BEEN ABLE TO LAUGH AND SEE THE FUNNY SIDE OF THINGS: AS MUCH AS I ALWAYS COULD
TOTAL SCORE: 2
I HAVE BLAMED MYSELF UNNECESSARILY WHEN THINGS WENT WRONG: NO, NEVER
I HAVE BEEN ANXIOUS OR WORRIED FOR NO GOOD REASON: YES, SOMETIMES
I HAVE BEEN SO UNHAPPY THAT I HAVE BEEN CRYING: NO, NEVER
THE THOUGHT OF HARMING MYSELF HAS OCCURRED TO ME: NEVER
I HAVE LOOKED FORWARD WITH ENJOYMENT TO THINGS: AS MUCH AS I EVER DID
I HAVE FELT SAD OR MISERABLE: NO, NOT AT ALL
I HAVE FELT SCARED OR PANICKY FOR NO GOOD REASON: NO, NOT AT ALL
THINGS HAVE BEEN GETTING ON TOP OF ME: NO, I HAVE BEEN COPING AS WELL AS EVER

## 2023-08-14 ENCOUNTER — PRENATAL OFFICE VISIT (OUTPATIENT)
Dept: MIDWIFE SERVICES | Facility: CLINIC | Age: 28
End: 2023-08-14
Payer: COMMERCIAL

## 2023-08-14 VITALS — BODY MASS INDEX: 26.47 KG/M2 | DIASTOLIC BLOOD PRESSURE: 62 MMHG | WEIGHT: 169 LBS | SYSTOLIC BLOOD PRESSURE: 108 MMHG

## 2023-08-14 DIAGNOSIS — Z12.4 SCREENING FOR CERVICAL CANCER: ICD-10-CM

## 2023-08-14 DIAGNOSIS — F32.9 MAJOR DEPRESSIVE DISORDER WITH SINGLE EPISODE, REMISSION STATUS UNSPECIFIED: ICD-10-CM

## 2023-08-14 PROCEDURE — 99024 POSTOP FOLLOW-UP VISIT: CPT | Performed by: ADVANCED PRACTICE MIDWIFE

## 2023-08-14 PROCEDURE — G0145 SCR C/V CYTO,THINLAYER,RESCR: HCPCS | Performed by: ADVANCED PRACTICE MIDWIFE

## 2023-08-14 PROCEDURE — 99213 OFFICE O/P EST LOW 20 MIN: CPT | Performed by: ADVANCED PRACTICE MIDWIFE

## 2023-08-14 RX ORDER — BUPROPION HYDROCHLORIDE 150 MG/1
150 TABLET ORAL EVERY MORNING
Qty: 90 TABLET | Refills: 3 | Status: SHIPPED | OUTPATIENT
Start: 2023-08-14 | End: 2023-12-14

## 2023-08-14 NOTE — NURSING NOTE
"Chief Complaint   Patient presents with    Postpartum Care     Bleeding has stopped.  Condoms for bc.         Initial /62   Wt 76.7 kg (169 lb)   LMP 2022 (Exact Date)   Breastfeeding Yes   BMI 26.47 kg/m   Estimated body mass index is 26.47 kg/m  as calculated from the following:    Height as of 23: 1.702 m (5' 7\").    Weight as of this encounter: 76.7 kg (169 lb).  BP completed using cuff size: regular    Questioned patient about current smoking habits.  Pt. has never smoked.             6week follow up.  Using condoms for birth control.  Pp edinb scale is 2.    Shawna Miranda LPN on 2023 at 8:07 AM             "

## 2023-08-14 NOTE — PROGRESS NOTES
Midwife Postpartum 2 Week Visit    Arcelia Andersen is a 28 year old here for a 2 week postpartum checkup.     Delivery date was 23. She had a  of a viable girl, weight 8 pounds 3 oz., with none complications      Since delivery, she has not been breast feeding.  She has not had any signs of infection. Her lochia is now scant.  She has not had other complications.      She is voiding and having bowel movements without difficulty.       Contraception was discussed and patient desires condoms.   She  has had intercourse since delivery.   She complains of mild perineal discomfort.     Mood is Stable  EPDS 2  Patient screened for postpartum depression.   Depression Rating was:   Last PHQ-9 score on record =       2020     1:00 PM   PHQ-9 SCORE   PHQ-9 Total Score 0     Last GAD7 score on record =       2020     1:00 PM   ADY-7 SCORE   Total Score 1     Alcohol Score = 0    ROS:   ROS: 10 point ROS neg other than the symptoms noted above in the HPI.       Current Outpatient Medications:     buPROPion (WELLBUTRIN XL) 150 MG 24 hr tablet, Take 1 tablet (150 mg) by mouth every morning, Disp: 90 tablet, Rfl: 3.   OB History    Para Term  AB Living   1 1 1 0 0 1   SAB IAB Ectopic Multiple Live Births   0 0 0 0 1      # Outcome Date GA Lbr Juan/2nd Weight Sex Delivery Anes PTL Lv   1 Term 23 40w2d 02:41 / 00:55 3.74 kg (8 lb 3.9 oz) F Vag-Spont Nitrous, IV, EPI N TRUPTI      Name: CULLEN,FEMALE-ARCELIA      Apgar1: 7  Apgar5: 9     Last pap:  No results found for: PAP  Hgb in hospital was 12.2    EXAM:  /62   Wt 76.7 kg (169 lb)   LMP 2022 (Exact Date)   Breastfeeding Yes   BMI 26.47 kg/m    BMI: Body mass index is 26.47 kg/m .  Exam:  Constitutional: healthy, alert and no distress  Respiratory: negative  Psychiatric: mentation appears normal and affect normal/bright    ASSESSMENT:   Normal postpartum exam after .    ICD-10-CM    1. Routine postpartum follow-up  Z39.2        2. Screening for cervical cancer  Z12.4 Pap screen reflex to HPV if ASCUS - recommend age 25 - 29      3. Major depressive disorder with single episode, remission status unspecified  F32.9 buPROPion (WELLBUTRIN XL) 150 MG 24 hr tablet        Mood stable, EPDS 2. Has been on this current dose of Wellbutrin x5 yrs. Desires to stay on current dose. Does not currently have therapist but aware this is available to her if needed. Refills sent x1 yr. Warning signs for worsening anxiety/depression reviewed.     PLAN:  No results found for any visits on 08/14/23.    Return for 6 week postpartum visit.  Teaching: exercise, birth control, and mental health  Family Planning:condoms  Encourage Kegels and abdominal exercise.  Continue a multivitamin/prenatal supplement, especially if breastfeeding.  Postpartum Hgb was not done today.

## 2023-08-17 LAB
BKR LAB AP GYN ADEQUACY: NORMAL
BKR LAB AP GYN INTERPRETATION: NORMAL
BKR LAB AP HPV REFLEX: NORMAL
BKR LAB AP PREVIOUS ABNORMAL: NORMAL
PATH REPORT.COMMENTS IMP SPEC: NORMAL
PATH REPORT.COMMENTS IMP SPEC: NORMAL
PATH REPORT.RELEVANT HX SPEC: NORMAL

## 2023-10-27 ENCOUNTER — MYC MEDICAL ADVICE (OUTPATIENT)
Dept: MIDWIFE SERVICES | Facility: CLINIC | Age: 28
End: 2023-10-27
Payer: COMMERCIAL

## 2023-10-27 NOTE — TELEPHONE ENCOUNTER
Please see Mobile Adst message and advise.    LOV 8/14/23 (6 wk PP visit)    Mary Ann GRIFFIN RN

## 2023-11-22 ENCOUNTER — VIRTUAL VISIT (OUTPATIENT)
Dept: MIDWIFE SERVICES | Facility: CLINIC | Age: 28
End: 2023-11-22
Payer: COMMERCIAL

## 2023-11-22 DIAGNOSIS — F41.8 MIXED ANXIETY AND DEPRESSIVE DISORDER: Primary | ICD-10-CM

## 2023-11-22 PROCEDURE — 99443 PR PHYSICIAN TELEPHONE EVALUATION 21-30 MIN: CPT | Mod: 95 | Performed by: ADVANCED PRACTICE MIDWIFE

## 2023-11-22 RX ORDER — FLUOXETINE 10 MG/1
CAPSULE ORAL
Qty: 53 CAPSULE | Refills: 1 | Status: SHIPPED | OUTPATIENT
Start: 2023-11-22 | End: 2023-12-14

## 2023-11-22 NOTE — PROGRESS NOTES
Arcelia is a 28 year old who is being evaluated via a billable telephone visit.      What phone number would you like to be contacted at? [phone]  How would you like to obtain your AVS? Akhil    Distant Location (provider location):  On-site, [facility]  Patient location: home    Subjective   Arcelia is a 28 year old, presenting for the following health issues:  Medication Question (Wellbutrin to zoloft.)    HPI     Arcelia presents for discussion of medication management of her Wellbutrin XL 150mg/day.   She states she has been on Wellbutrin x5 yrs. She primarily started it for symptoms of depression, which were well managed on this medication. She reports that since the birth of her child 6/30/23, she has been noticing an increase in her anxiety symptoms. She denies thoughts of hurting herself or others. She is hoping to switch to either Prozac or Zoloft to see if this would help better manage her anxiety symptoms. She would like to pick one with the least risk of weight gain.       Review of Systems    ROS: 10 point ROS neg other than the symptoms noted above in the HPI.       Objective           Vitals:  No vitals were obtained today due to virtual visit.    Physical Exam   healthy, alert, and no distress  PSYCH: Alert and oriented times 3; coherent speech, normal   rate and volume, able to articulate logical thoughts, able   to abstract reason, no tangential thoughts, no hallucinations   or delusions  Her affect is normal  RESP: No cough, no audible wheezing, able to talk in full sentences  Remainder of exam unable to be completed due to telephone visits    ASSESSMENT/PLAN:    (F41.8) Mixed anxiety and depressive disorder  (primary encounter diagnosis)  Plan: FLUoxetine (PROZAC) 10 MG capsule  Take 1 pill (10mg) for first week, then increase to 2 pills (20mg) a day, Disp-53 capsule, R-1, E-Prescribe   - will need to taper off Wellbutrin and bridge to Prozac    Week 1:  10mg Prozac daily  150mg Wellbutrin XL every  other day    Week 2:  20mg of Prozac daily  150mg Wellbutrin XL every third day    Week 3:  20mg of Prozac daily  No Wellbutrin     Discussed need for gradual increase of SSRI dose over time, titrating to effect.  Reviewed potential for initial side effects (such as headache, GI symptoms, and dry mouth) that will likely subside after a week or so, but that therapeutic effects will likely take 1-2 weeks - so it's important to stick with medication for at least a month to adequately gauge effect.  Notify me of any significant side effects.  Discussed that treatment with SSRI medications requires a minimum commitment of 9-12 months; shorter courses are associated with rebound symptoms.  Discussed potential long-term side effects including sexual side effects.      Follow up in 3-4 wks for  med check    Phone call duration: 20 minutes     MARIA M Cardoza CNM    Total time spent was 27 minutes; this includes pre-work time, intra-service time, and post-work time including time spent on documentation which occurred on the date of service.

## 2023-11-22 NOTE — PATIENT INSTRUCTIONS
Week 1:  10mg Prozac daily  150mg Wellbutrin XL every other day    Week 2:  20mg of Prozac daily  150mg Wellbutrin XL every third day    Week 3:  20mg of Prozac daily  No Wellbutrin

## 2023-12-14 ENCOUNTER — VIRTUAL VISIT (OUTPATIENT)
Dept: MIDWIFE SERVICES | Facility: CLINIC | Age: 28
End: 2023-12-14
Payer: COMMERCIAL

## 2023-12-14 DIAGNOSIS — F41.8 MIXED ANXIETY AND DEPRESSIVE DISORDER: Primary | ICD-10-CM

## 2023-12-14 PROCEDURE — 99442 PR PHYSICIAN TELEPHONE EVALUATION 11-20 MIN: CPT | Performed by: ADVANCED PRACTICE MIDWIFE

## 2023-12-14 NOTE — NURSING NOTE
Arcelia Andersen is a 28 year old female who is being evaluated via a billable telephone visit.      What phone number would you like to be contacted at? [phone]  How would you like to obtain your AVS? Akhil      Arcelia Andersen is a 28 year old female who presents for virtual visit today for the following health issue(s):  No chief complaint on file.      Additional information: Medication check.  Was on wellbutrin and switched to prozac. 20mg.  No other issues.               Shawna Miranda LPN on 12/14/2023 at 8:50 AM

## 2023-12-14 NOTE — PROGRESS NOTES
Arcelia is a 28 year old who is being evaluated via a billable telephone visit.      What phone number would you like to be contacted at? [phone]  How would you like to obtain your AVS? Akhil    Distant Location (provider location):  On-site [facility]  Patient location : home         Subjective   Arcelia is a 28 year old, presenting for the following health issues:  Medication Follow-up (Prozac)    HPI     Arcelia presents for med check after tapering off Wellbutrin and stating Prozac on 11/22/23.   She states the taper/ramp up went well. She initially felt some sleepiness but that has resolved and she hasn't experienced any negative side effects. She feels her mood has improved on the 20mg Prozac. She feels her anxiety has decreased and she's less irritable. She is happy on this current dose and does not desire to increase at this time .       Review of Systems    ROS: 10 point ROS neg other than the symptoms noted above in the HPI.        Objective           Vitals:  No vitals were obtained today due to virtual visit.    Physical Exam   healthy, alert, and no distress  PSYCH: Alert and oriented times 3; coherent speech, normal   rate and volume, able to articulate logical thoughts, able   to abstract reason, no tangential thoughts, no hallucinations   or delusions  Her affect is normal  RESP: No cough, no audible wheezing, able to talk in full sentences  Remainder of exam unable to be completed due to telephone visits    ASSESSMENT/PLAN:     1. (F41.8) Mixed anxiety and depressive disorder  (primary encounter diagnosis)  Plan: FLUoxetine (PROZAC) 20 MG capsule  - continue with current dose of 20mg Prozac  - May try PM dosing to see if this helps with sleepiness  - Refills given x1 yr  - advised against stopping medication abruptly without clinician guidance  - Follow up in 1yr or sooner if she desires to change her medication dose     MARIA M Cardoza, JAHAIRA    Total time spent was 15 minutes; this includes pre-work  time, intra-service time, and post-work time including time spent on documentation which occurred on the date of service.

## 2024-03-09 ENCOUNTER — HEALTH MAINTENANCE LETTER (OUTPATIENT)
Age: 29
End: 2024-03-09

## 2024-07-29 ENCOUNTER — MYC MEDICAL ADVICE (OUTPATIENT)
Dept: MIDWIFE SERVICES | Facility: CLINIC | Age: 29
End: 2024-07-29
Payer: COMMERCIAL

## 2024-07-29 DIAGNOSIS — Z30.011 ENCOUNTER FOR INITIAL PRESCRIPTION OF CONTRACEPTIVE PILLS: Primary | ICD-10-CM

## 2024-07-29 RX ORDER — LEVONORGESTREL/ETHIN.ESTRADIOL 0.1-0.02MG
1 TABLET ORAL DAILY
Qty: 84 TABLET | Refills: 3 | Status: SHIPPED | OUTPATIENT
Start: 2024-07-29

## 2024-11-14 DIAGNOSIS — F41.8 MIXED ANXIETY AND DEPRESSIVE DISORDER: ICD-10-CM

## 2024-11-14 NOTE — TELEPHONE ENCOUNTER
[facility]    Phone Message    May a detailed message be left on voicemail: yes     Reason for Call: Medication Refill Request    Has the patient contacted the pharmacy for the refill? Yes   Name of medication being requested:   FLUoxetine (PROZAC) 20 MG capsule  Provider who prescribed the medication: Carol Clemente  Pharmacy: [facility]- [address], [place] [address]  Date medication is needed: As soon as possible     Action Taken: Other: rd midwife     Travel Screening: Not Applicable     Date of Service:

## 2025-02-02 SDOH — HEALTH STABILITY: PHYSICAL HEALTH: ON AVERAGE, HOW MANY DAYS PER WEEK DO YOU ENGAGE IN MODERATE TO STRENUOUS EXERCISE (LIKE A BRISK WALK)?: 4 DAYS

## 2025-02-02 SDOH — HEALTH STABILITY: PHYSICAL HEALTH: ON AVERAGE, HOW MANY MINUTES DO YOU ENGAGE IN EXERCISE AT THIS LEVEL?: 30 MIN

## 2025-02-02 ASSESSMENT — ANXIETY QUESTIONNAIRES
IF YOU CHECKED OFF ANY PROBLEMS ON THIS QUESTIONNAIRE, HOW DIFFICULT HAVE THESE PROBLEMS MADE IT FOR YOU TO DO YOUR WORK, TAKE CARE OF THINGS AT HOME, OR GET ALONG WITH OTHER PEOPLE: NOT DIFFICULT AT ALL
5. BEING SO RESTLESS THAT IT IS HARD TO SIT STILL: SEVERAL DAYS
GAD7 TOTAL SCORE: 2
7. FEELING AFRAID AS IF SOMETHING AWFUL MIGHT HAPPEN: NOT AT ALL
4. TROUBLE RELAXING: SEVERAL DAYS
6. BECOMING EASILY ANNOYED OR IRRITABLE: NOT AT ALL
8. IF YOU CHECKED OFF ANY PROBLEMS, HOW DIFFICULT HAVE THESE MADE IT FOR YOU TO DO YOUR WORK, TAKE CARE OF THINGS AT HOME, OR GET ALONG WITH OTHER PEOPLE?: NOT DIFFICULT AT ALL
2. NOT BEING ABLE TO STOP OR CONTROL WORRYING: NOT AT ALL
GAD7 TOTAL SCORE: 2
7. FEELING AFRAID AS IF SOMETHING AWFUL MIGHT HAPPEN: NOT AT ALL
GAD7 TOTAL SCORE: 2
3. WORRYING TOO MUCH ABOUT DIFFERENT THINGS: NOT AT ALL
1. FEELING NERVOUS, ANXIOUS, OR ON EDGE: NOT AT ALL

## 2025-02-02 ASSESSMENT — SOCIAL DETERMINANTS OF HEALTH (SDOH): HOW OFTEN DO YOU GET TOGETHER WITH FRIENDS OR RELATIVES?: ONCE A WEEK

## 2025-02-02 ASSESSMENT — PATIENT HEALTH QUESTIONNAIRE - PHQ9
SUM OF ALL RESPONSES TO PHQ QUESTIONS 1-9: 3
SUM OF ALL RESPONSES TO PHQ QUESTIONS 1-9: 3
10. IF YOU CHECKED OFF ANY PROBLEMS, HOW DIFFICULT HAVE THESE PROBLEMS MADE IT FOR YOU TO DO YOUR WORK, TAKE CARE OF THINGS AT HOME, OR GET ALONG WITH OTHER PEOPLE: SOMEWHAT DIFFICULT

## 2025-02-03 ENCOUNTER — OFFICE VISIT (OUTPATIENT)
Dept: FAMILY MEDICINE | Facility: CLINIC | Age: 30
End: 2025-02-03
Payer: COMMERCIAL

## 2025-02-03 VITALS
WEIGHT: 172.3 LBS | BODY MASS INDEX: 27.04 KG/M2 | HEART RATE: 55 BPM | TEMPERATURE: 98.2 F | HEIGHT: 67 IN | OXYGEN SATURATION: 98 % | RESPIRATION RATE: 13 BRPM | SYSTOLIC BLOOD PRESSURE: 116 MMHG | DIASTOLIC BLOOD PRESSURE: 58 MMHG

## 2025-02-03 DIAGNOSIS — Z30.011 ENCOUNTER FOR INITIAL PRESCRIPTION OF CONTRACEPTIVE PILLS: ICD-10-CM

## 2025-02-03 DIAGNOSIS — F41.8 MIXED ANXIETY AND DEPRESSIVE DISORDER: ICD-10-CM

## 2025-02-03 DIAGNOSIS — Z00.00 ROUTINE GENERAL MEDICAL EXAMINATION AT A HEALTH CARE FACILITY: Primary | ICD-10-CM

## 2025-02-03 PROCEDURE — 99385 PREV VISIT NEW AGE 18-39: CPT

## 2025-02-03 PROCEDURE — 99213 OFFICE O/P EST LOW 20 MIN: CPT | Mod: 25

## 2025-02-03 RX ORDER — LEVONORGESTREL/ETHIN.ESTRADIOL 0.1-0.02MG
1 TABLET ORAL DAILY
Qty: 84 TABLET | Refills: 3 | Status: SHIPPED | OUTPATIENT
Start: 2025-02-03

## 2025-02-03 NOTE — PROGRESS NOTES
"Preventive Care Visit  [facility]  Pauly Juarez PA-C, Family Medicine  Feb 3, 2025    Assessment & Plan     (Z00.00) Routine general medical examination at a health care facility  (primary encounter diagnosis)  Stable exam. Routine screening labs were done in recent years and normal. Will plan to do labs again with physical next year. Follow up in 1 year for annual wellness; sooner as needed for acute concerns.  Plan: PRIMARY CARE FOLLOW-UP SCHEDULING          (F41.8) Mixed anxiety and depressive disorder  Well controlled with use of fluoxetine daily. No new side effects of the medication. Refill provided.  Plan: FLUoxetine (PROZAC) 20 MG capsule          (Z30.011) Encounter for initial prescription of contraceptive pills  Well controlled with use of OCP contraceptive. No new side effects of the medication. Refill provided.  Plan: levonorgestrel-ethinyl estradiol (AVIANE)         0.1-20 MG-MCG tablet          Patient has been advised of split billing requirements and indicates understanding: Yes    BMI  Estimated body mass index is 26.99 kg/m  as calculated from the following:    Height as of this encounter: 1.702 m (5' 7\").    Weight as of this encounter: 78.2 kg (172 lb 4.8 oz).   Weight management plan: Discussed healthy diet and exercise guidelines    Counseling  Appropriate preventive services were addressed with this patient via screening, questionnaire, or discussion as appropriate for fall prevention, nutrition, physical activity, Tobacco-use cessation, social engagement, weight loss and cognition.  Checklist reviewing preventive services available has been given to the patient.  Reviewed patient's diet, addressing concerns and/or questions.       Follow up in 1 year for physical; sooner with any acute concerns.    Michelle Paniagua is a 29 year old, presenting for the following:  Physical        2/3/2025     1:01 PM   Additional Questions   Roomed by Fani Verduzco    "       HPI    Nodule in vaginal canal. First noticed it a month ago. Not painful to the touch but after pressing on the nodule a few hours later she had some lower abdominal discomfort. No vaginal discharge or abnormal vaginal bleeding.    Currently working with online therapist. They are undergoing testing for ADHD assessment and may decide to do medication. If medication would be helpful the online therapist is unable to prescribe and would plan to do through primary. We discussed and can do that-would like to do a virtual visit to discuss medication options prior to initiating.     Health Care Directive  Patient does not have a Health Care Directive: Discussed advance care planning with patient; however, patient declined at this time.      2/2/2025   General Health   How would you rate your overall physical health? Good   Feel stress (tense, anxious, or unable to sleep) Only a little   (!) STRESS CONCERN      2/2/2025   Nutrition   Three or more servings of calcium each day? Yes   Diet: Regular (no restrictions)   How many servings of fruit and vegetables per day? (!) 2-3   How many sweetened beverages each day? 0-1         2/2/2025   Exercise   Days per week of moderate/strenous exercise 4 days   Average minutes spent exercising at this level 30 min         2/2/2025   Social Factors   Frequency of gathering with friends or relatives Once a week   Worry food won't last until get money to buy more No   Food not last or not have enough money for food? No   Do you have housing? (Housing is defined as stable permanent housing and does not include staying ouside in a car, in a tent, in an abandoned building, in an overnight shelter, or couch-surfing.) Yes   Are you worried about losing your housing? No   Lack of transportation? No   Unable to get utilities (heat,electricity)? No         2/2/2025   Dental   Dentist two times every year? Yes     Today's PHQ-9 Score:       2/2/2025     7:09 PM   PHQ-9 SCORE   PHQ-9 Total  Score MyChart 3 (Minimal depression)   PHQ-9 Total Score 3        Patient-reported         2/2/2025   Substance Use   Alcohol more than 3/day or more than 7/wk No   Do you use any other substances recreationally? No     Social History     Tobacco Use    Smoking status: Never    Smokeless tobacco: Never   Vaping Use    Vaping status: Never Used   Substance Use Topics    Alcohol use: Yes     Comment: Alcoholic Drinks/day: occasionally once a month    Drug use: No     Mammogram Screening - Patient under 40 years of age: Routine Mammogram Screening not recommended.         2/2/2025   STI Screening   New sexual partner(s) since last STI/HIV test? No     History of abnormal Pap smear: No - age 21-29 PAP every 3 years recommended      8/14/2023     8:31 AM 8/1/2017    11:32 AM 6/24/2016    10:00 AM   PAP / HPV   PAP Negative for Intraepithelial Lesion or Malignancy (NILM)  Negative for squamous intraepithelial lesion or malignancy  Electronically signed by Callie Couch CT (ASCP) on 8/8/2017 at  6:30 AM    Negative for squamous intraepithelial lesion or malignancy  Electronically signed by Callie Couch CT (ASCP) on 6/29/2016 at  3:25 PM            2/2/2025   Contraception/Family Planning   Questions about contraception or family planning No     Reviewed and updated as needed this visit by Provider   Tobacco  Allergies  Meds  Problems  Med Hx  Surg Hx  Fam Hx            Past Medical History:   Diagnosis Date    Anemia     Depressive disorder     Major depression, single episode 11/1/2019    Created by Conversion   Formatting of this note might be different from the original. Created by Conversion Formatting of this note might be different from the original. Created by Conversion Created by Conversion Formatting of this note might be different from the original. Created by Conversion    Urinary tract infection     Varicella      History reviewed. No pertinent surgical history.    BP Readings from  Last 3 Encounters:   02/03/25 116/58   08/14/23 108/62   07/01/23 127/72    Wt Readings from Last 3 Encounters:   02/03/25 78.2 kg (172 lb 4.8 oz)   08/14/23 76.7 kg (169 lb)   06/30/23 87.1 kg (192 lb)         Patient Active Problem List   Diagnosis    Anxiety    Anemia    Hematuria    Mild recurrent major depression    Depression during pregnancy, antepartum    Encounter for supervision of normal first pregnancy in first trimester    Indication for care in labor or delivery     History reviewed. No pertinent surgical history.    Social History     Tobacco Use    Smoking status: Never    Smokeless tobacco: Never   Substance Use Topics    Alcohol use: Yes     Comment: Alcoholic Drinks/day: occasionally once a month     Family History   Problem Relation Age of Onset    No Known Problems Mother     Asthma Father     Depression Father     Kidney Disease Father     Mental Illness Father         Adhd    Depression Sister     Depression Sister     No Known Problems Brother     Diabetes Maternal Grandfather     Brain Cancer Paternal Grandmother     Depression Paternal Grandfather     Depression Paternal Aunt         several    Hypertension Other         family history of this    Mental Illness Brother         Adhd    Mental Illness Nephew         Adhd         Current Outpatient Medications   Medication Sig Dispense Refill    FLUoxetine (PROZAC) 20 MG capsule Take 1 capsule (20 mg) by mouth daily. 90 capsule 3    levonorgestrel-ethinyl estradiol (AVIANE) 0.1-20 MG-MCG tablet Take 1 tablet by mouth daily. 84 tablet 3     Allergies   Allergen Reactions    Tree Nut [Nuts] Hives, Itching and Shortness Of Breath    Nuts - Unspecified [Nuts] Hives and Itching     Annotation: tree nuts cause throat swelling       Review of Systems  Constitutional, HEENT, cardiovascular, pulmonary, GI, , musculoskeletal, neuro, skin, endocrine and psych systems are negative, except as otherwise noted.       Objective    Exam  /58 (BP  "Location: Right arm, Patient Position: Sitting, Cuff Size: Adult Regular)   Pulse 55   Temp 98.2  F (36.8  C) (Oral)   Resp 13   Ht 1.702 m (5' 7\")   Wt 78.2 kg (172 lb 4.8 oz)   LMP 01/21/2025 (Approximate)   SpO2 98%   Breastfeeding No   BMI 26.99 kg/m     Estimated body mass index is 26.99 kg/m  as calculated from the following:    Height as of this encounter: 1.702 m (5' 7\").    Weight as of this encounter: 78.2 kg (172 lb 4.8 oz).    Physical Exam  GENERAL: alert and no distress  EYES: Eyes grossly normal to inspection, conjunctivae and sclerae normal  HENT: ear canals and TM's normal, nose and mouth without ulcers or lesions  NECK: no adenopathy, no asymmetry, masses, or scars  RESP: lungs clear to auscultation - no rales, rhonchi or wheezes  BREAST: normal without masses, tenderness or nipple discharge and no palpable axillary masses or adenopathy  CV: regular rate and rhythm, normal S1 S2, no S3 or S4, no murmur  ABDOMEN: soft, nontender, no masses and bowel sounds normal   (female): normal female external genitalia, normal urethral meatus, normal vaginal mucosa but there is a small nodule which seems consistent with cyst, no bleeding, no skin changes, no discoloration  MS: no gross musculoskeletal defects noted, no edema  SKIN: no suspicious lesions or rashes  NEURO: Normal strength and tone, mentation intact and speech normal  PSYCH: mentation appears normal, affect normal/bright      Signed Electronically by: Pauly Juarez PA-C    Answers submitted by the patient for this visit:  Patient Health Questionnaire (Submitted on 2/2/2025)  If you checked off any problems, how difficult have these problems made it for you to do your work, take care of things at home, or get along with other people?: Somewhat difficult  PHQ9 TOTAL SCORE: 3  Patient Health Questionnaire (G7) (Submitted on 2/2/2025)  ADY 7 TOTAL SCORE: 2    "

## 2025-02-03 NOTE — PATIENT INSTRUCTIONS
Patient Education   Preventive Care Advice   This is general advice given by our system to help you stay healthy. However, your care team may have specific advice just for you. Please talk to your care team about your preventive care needs.  Nutrition  Eat 5 or more servings of fruits and vegetables each day.  Try wheat bread, brown rice and whole grain pasta (instead of white bread, rice, and pasta).  Get enough calcium and vitamin D. Check the label on foods and aim for 100% of the RDA (recommended daily allowance).  Lifestyle  Exercise at least 150 minutes each week  (30 minutes a day, 5 days a week).  Do muscle strengthening activities 2 days a week. These help control your weight and prevent disease.  No smoking.  Wear sunscreen to prevent skin cancer.  Have a dental exam and cleaning every 6 months.  Yearly exams  See your health care team every year to talk about:  Any changes in your health.  Any medicines your care team has prescribed.  Preventive care, family planning, and ways to prevent chronic diseases.  Shots (vaccines)   HPV shots (up to age 26), if you've never had them before.  Hepatitis B shots (up to age 59), if you've never had them before.  COVID-19 shot: Get this shot when it's due.  Flu shot: Get a flu shot every year.  Tetanus shot: Get a tetanus shot every 10 years.  Pneumococcal, hepatitis A, and RSV shots: Ask your care team if you need these based on your risk.  Shingles shot (for age 50 and up)  General health tests  Diabetes screening:  Starting at age 35, Get screened for diabetes at least every 3 years.  If you are younger than age 35, ask your care team if you should be screened for diabetes.  Cholesterol test: At age 39, start having a cholesterol test every 5 years, or more often if advised.  Bone density scan (DEXA): At age 50, ask your care team if you should have this scan for osteoporosis (brittle bones).  Hepatitis C: Get tested at least once in your life.  STIs (sexually  transmitted infections)  Before age 24: Ask your care team if you should be screened for STIs.  After age 24: Get screened for STIs if you're at risk. You are at risk for STIs (including HIV) if:  You are sexually active with more than one person.  You don't use condoms every time.  You or a partner was diagnosed with a sexually transmitted infection.  If you are at risk for HIV, ask about PrEP medicine to prevent HIV.  Get tested for HIV at least once in your life, whether you are at risk for HIV or not.  Cancer screening tests  Cervical cancer screening: If you have a cervix, begin getting regular cervical cancer screening tests starting at age 21.  Breast cancer scan (mammogram): If you've ever had breasts, begin having regular mammograms starting at age 40. This is a scan to check for breast cancer.  Colon cancer screening: It is important to start screening for colon cancer at age 45.  Have a colonoscopy test every 10 years (or more often if you're at risk) Or, ask your provider about stool tests like a FIT test every year or Cologuard test every 3 years.  To learn more about your testing options, visit:   .  For help making a decision, visit:   https://bit.ly/vm96899.  Prostate cancer screening test: If you have a prostate, ask your care team if a prostate cancer screening test (PSA) at age 55 is right for you.  Lung cancer screening: If you are a current or former smoker ages 50 to 80, ask your care team if ongoing lung cancer screenings are right for you.  For informational purposes only. Not to replace the advice of your health care provider. Copyright   2023 [employer]. All rights reserved. Clinically reviewed by the [facility]. Swrve [id number] - REV 01/24.

## 2025-02-05 ENCOUNTER — TELEPHONE (OUTPATIENT)
Dept: FAMILY MEDICINE | Facility: CLINIC | Age: 30
End: 2025-02-05
Payer: COMMERCIAL

## 2025-02-05 NOTE — TELEPHONE ENCOUNTER
Reason for Call:  Appointment Request    Patient requesting this type of appt:  video Visit    Requested provider: Pauly Juarez    Reason patient unable to be scheduled: Not within requested timeframe    When does patient want to be seen/preferred time: 3-7 days    Comments: Patient has an appointment on 02/19 for a med check, but is requesting to be worked in sooner    Could we send this information to you in [facility] or would you prefer to receive a phone call?:   Patient would prefer a phone call   Okay to leave a detailed message?: Yes at Home number on file [phone] (home)    Call taken on 2/5/2025 at 7:15 AM by Martha Fernández

## 2025-02-13 ENCOUNTER — MYC MEDICAL ADVICE (OUTPATIENT)
Dept: FAMILY MEDICINE | Facility: CLINIC | Age: 30
End: 2025-02-13
Payer: COMMERCIAL

## 2025-02-13 NOTE — TELEPHONE ENCOUNTER
Larry Coats, PAKevinC    Please see my chart message information regarding ADHD for upcoming appt 2/19/2025     Yvrose Manzo, Registered Nurse  [facility]

## 2025-02-19 ENCOUNTER — VIRTUAL VISIT (OUTPATIENT)
Dept: FAMILY MEDICINE | Facility: CLINIC | Age: 30
End: 2025-02-19
Payer: COMMERCIAL

## 2025-02-19 DIAGNOSIS — F90.2 ADHD (ATTENTION DEFICIT HYPERACTIVITY DISORDER), COMBINED TYPE: Primary | ICD-10-CM

## 2025-02-19 PROCEDURE — 98005 SYNCH AUDIO-VIDEO EST LOW 20: CPT

## 2025-02-19 RX ORDER — LISDEXAMFETAMINE DIMESYLATE 30 MG/1
30 CAPSULE ORAL EVERY MORNING
Qty: 30 CAPSULE | Refills: 0 | Status: SHIPPED | OUTPATIENT
Start: 2025-02-19

## 2025-02-19 NOTE — PROGRESS NOTES
"Arcelia is a 29 year old who is being evaluated via a billable video visit.    How would you like to obtain your AVS? MyChart  If the video visit is dropped, the invitation should be resent by: Text to cell phone: [phone]  Will anyone else be joining your video visit? No      Assessment & Plan     (F90.2) ADHD (attention deficit hyperactivity disorder), combined type  (primary encounter diagnosis)  Completed ADHD evaluation and testing through [facility] earlier this year and assessment showed she met criteria for ADHD, combined type. We had a discussion today regarding medication options and ultimately will start Vyvanse 30 mg daily. Plan to follow up in person in 4 weeks to do CSA, discuss medication and complete urine drug screen; sooner with any acute concerns.   Plan: lisdexamfetamine (VYVANSE) 30 MG capsule          The longitudinal plan of care for the diagnosis(es)/condition(s) as documented were addressed during this visit. Due to the added complexity in care, I will continue to support Arcelia in the subsequent management and with ongoing continuity of care.    Follow up in 4 weeks for med check; sooner with any acute concerns.    Subjective   Arcelia is a 29 year old, presenting for the following health issues:  A.D.H.D (Recently diagnosed from therapist- looking to be prescribed medication )        2/19/2025    12:47 PM   Additional Questions   Roomed by Nelly BRICENO     History of Present Illness       Reason for visit:  Adhd medication   She is taking medications regularly.     Father and brother both are diagnosed with ADHD as well. Brother is on Vyvanse and working well for him.       Review of Systems  Constitutional, HEENT, cardiovascular, pulmonary, gi and gu systems are negative, except as otherwise noted.        Objective    Vitals - Patient Reported  Weight (Patient Reported): 73.5 kg (162 lb)  Height (Patient Reported): 170.2 cm (5' 7\")  BMI (Based on Pt Reported Ht/Wt): 25.37      Vitals:  No vitals " were obtained today due to virtual visit.    Physical Exam   GENERAL: alert and no distress  EYES: Eyes grossly normal to inspection.  No discharge or erythema, or obvious scleral/conjunctival abnormalities.  RESP: No audible wheeze, cough, or visible cyanosis.    SKIN: Visible skin clear. No significant rash, abnormal pigmentation or lesions.  NEURO: Cranial nerves grossly intact.  Mentation and speech appropriate for age.  PSYCH: Appropriate affect, tone, and pace of words      Video-Visit Details    Type of service:  Video Visit     Originating Location (pt. Location): Home  Distant Location (provider location):  On-site  Platform used for Video Visit: Elissa    Signed Electronically by: Pauly Juarez PA-C

## 2025-03-06 ENCOUNTER — VIRTUAL VISIT (OUTPATIENT)
Dept: FAMILY MEDICINE | Facility: CLINIC | Age: 30
End: 2025-03-06
Payer: COMMERCIAL

## 2025-03-06 DIAGNOSIS — F90.2 ADHD (ATTENTION DEFICIT HYPERACTIVITY DISORDER), COMBINED TYPE: Primary | ICD-10-CM

## 2025-03-06 RX ORDER — LISDEXAMFETAMINE DIMESYLATE 40 MG/1
40 CAPSULE ORAL EVERY MORNING
Qty: 14 CAPSULE | Refills: 0 | Status: SHIPPED | OUTPATIENT
Start: 2025-03-06

## 2025-03-06 NOTE — PROGRESS NOTES
Arcelia is a 29 year old who is being evaluated via a billable video visit.    How would you like to obtain your AVS? MyChart  If the video visit is dropped, the invitation should be resent by: Text to cell phone: [phone]  Will anyone else be joining your video visit? No      Assessment & Plan     (F90.2) ADHD (attention deficit hyperactivity disorder), combined type  (primary encounter diagnosis)  Has noticed some improvement in symptoms with Vyvanse initiation but feels it could be doing a little more to control symptoms. We discussed options and will increase Vyvanse to 40 mg. If not providing any additional relief could consider 50 mg dosing otherwise may want to consider alternative medication. Will discuss further at follow up based on response to increased dose.   Plan: lisdexamfetamine (VYVANSE) 40 MG capsule          The longitudinal plan of care for the diagnosis(es)/condition(s) as documented were addressed during this visit. Due to the added complexity in care, I will continue to support Arcelia in the subsequent management and with ongoing continuity of care.    Follow up in 3-4 weeks; sooner with any acute concerns.    Subjective   Arcelia is a 29 year old, presenting for the following health issues:  A.D.H.D        3/6/2025     7:56 AM   Additional Questions   Roomed by Fani STANLEY    History of Present Illness       Reason for visit:  ADHD med dose    She eats 2-3 servings of fruits and vegetables daily.She consumes 0 sweetened beverage(s) daily.She exercises with enough effort to increase her heart rate 20 to 29 minutes per day.  She exercises with enough effort to increase her heart rate 4 days per week.   She is taking medications regularly.        ADHD Follow-Up (Adult)  Concerns: none  Changes since last visit: Improving  Taking controlled (daily) medications as prescribed: Yes  Sleep: no problems  Adult ADHD Self-Reporting form given to patient?:  No  Currently in counseling:  Yes    Medication Benefits:   Controlled symptoms: feel more calm   Uncontrolled symptoms:  listening and concentrating     Medication Side Effects:  Reports:  none  Sleep Problems? no        Review of Systems  Constitutional, HEENT, cardiovascular, pulmonary, gi and gu systems are negative, except as otherwise noted.        Objective           Vitals:  No vitals were obtained today due to virtual visit.    Physical Exam   GENERAL: alert and no distress  EYES: Eyes grossly normal to inspection.  No discharge or erythema, or obvious scleral/conjunctival abnormalities.  RESP: No audible wheeze, cough, or visible cyanosis.    SKIN: Visible skin clear. No significant rash, abnormal pigmentation or lesions.  NEURO: Cranial nerves grossly intact.  Mentation and speech appropriate for age.  PSYCH: Appropriate affect, tone, and pace of words          Video-Visit Details    Type of service:  Video Visit   Originating Location (pt. Location): Home    Distant Location (provider location):  Off-site  Platform used for Video Visit: Elissa  Signed Electronically by: Pauly Juarez PA-C

## 2025-03-19 ENCOUNTER — OFFICE VISIT (OUTPATIENT)
Dept: FAMILY MEDICINE | Facility: CLINIC | Age: 30
End: 2025-03-19
Payer: COMMERCIAL

## 2025-03-19 VITALS
HEIGHT: 67 IN | TEMPERATURE: 97.5 F | BODY MASS INDEX: 26.76 KG/M2 | HEART RATE: 67 BPM | WEIGHT: 170.5 LBS | SYSTOLIC BLOOD PRESSURE: 119 MMHG | OXYGEN SATURATION: 97 % | DIASTOLIC BLOOD PRESSURE: 74 MMHG | RESPIRATION RATE: 20 BRPM

## 2025-03-19 DIAGNOSIS — F90.2 ADHD (ATTENTION DEFICIT HYPERACTIVITY DISORDER), COMBINED TYPE: Primary | ICD-10-CM

## 2025-03-19 PROCEDURE — 99213 OFFICE O/P EST LOW 20 MIN: CPT

## 2025-03-19 PROCEDURE — G2211 COMPLEX E/M VISIT ADD ON: HCPCS

## 2025-03-19 PROCEDURE — 1126F AMNT PAIN NOTED NONE PRSNT: CPT

## 2025-03-19 PROCEDURE — 3078F DIAST BP <80 MM HG: CPT

## 2025-03-19 PROCEDURE — 3074F SYST BP LT 130 MM HG: CPT

## 2025-03-19 RX ORDER — LISDEXAMFETAMINE DIMESYLATE 30 MG/1
30 CAPSULE ORAL DAILY
Qty: 30 CAPSULE | Refills: 0 | Status: SHIPPED | OUTPATIENT
Start: 2025-04-18 | End: 2025-05-18

## 2025-03-19 RX ORDER — LISDEXAMFETAMINE DIMESYLATE 30 MG/1
30 CAPSULE ORAL DAILY
Qty: 30 CAPSULE | Refills: 0 | Status: SHIPPED | OUTPATIENT
Start: 2025-05-18 | End: 2025-06-17

## 2025-03-19 RX ORDER — LISDEXAMFETAMINE DIMESYLATE 30 MG/1
30 CAPSULE ORAL DAILY
Qty: 30 CAPSULE | Refills: 0 | Status: SHIPPED | OUTPATIENT
Start: 2025-03-19 | End: 2025-04-18

## 2025-03-19 ASSESSMENT — PAIN SCALES - GENERAL: PAINLEVEL_OUTOF10: NO PAIN (0)

## 2025-03-19 NOTE — PROGRESS NOTES
Assessment & Plan     (F90.2) ADHD (attention deficit hyperactivity disorder), combined type  (primary encounter diagnosis)  Tried the 40 mg Vyvanse dose but was too much and was affecting sleep. Went back to using the 30 mg dose and that has been working well for her. She would like to continue with Vyvanse 30 mg daily. CSA signed in clinic today. Urine drug screening pending. Follow up in 6 months for recheck; sooner with any acute concerns.  Plan: lisdexamfetamine (VYVANSE) 30 MG capsule,         lisdexamfetamine (VYVANSE) 30 MG capsule,         lisdexamfetamine (VYVANSE) 30 MG capsule, Drug         Confirmation Panel Urine with Creat - lab         collect            The longitudinal plan of care for the diagnosis(es)/condition(s) as documented were addressed during this visit. Due to the added complexity in care, I will continue to support Arcelia in the subsequent management and with ongoing continuity of care.    Follow up in 6 months for med check; sooner with any acute concerns.    Subjective   Arcelia is a 29 year old, presenting for the following health issues:  A.D.H.D and Recheck Medication      3/19/2025    12:33 PM   Additional Questions   Roomed by araceli ramos   Accompanied by daughter - racarlos     A.D.H.D      ADHD Follow-Up (Adult)  Concerns: back to 30mg and is doing well on the dose  Changes since last visit: Improving and Stable  Taking controlled (daily) medications as prescribed: Yes  Sleep: restless with 40mg and now improved on 30mg.  Adult ADHD Self-Reporting form given to patient?:  No    Medication Benefits:   Controlled symptoms: Hyperactivity - motor restlessness, Attention span, and Finishing tasks  Uncontrolled symptoms:  None    Medication Side Effects:  Reports:  evening has little headache and unsure if related to the medication   Sleep Problems? no      Review of Systems  Constitutional, HEENT, cardiovascular, pulmonary, gi and gu systems are negative, except as otherwise noted.       "  Objective    /74 (BP Location: Right arm, Patient Position: Chair, Cuff Size: Adult Regular)   Pulse 67   Temp 97.5  F (36.4  C) (Oral)   Resp 20   Ht 1.702 m (5' 7\")   Wt 77.3 kg (170 lb 8 oz)   LMP 03/18/2025 (Exact Date)   SpO2 97%   BMI 26.70 kg/m    Body mass index is 26.7 kg/m .  Physical Exam   GENERAL: alert and no distress  EYES: Eyes grossly normal to inspection, conjunctivae and sclerae normal  RESP: lungs clear to auscultation - no rales, rhonchi or wheezes  CV: regular rate and rhythm, normal S1 S2, no S3 or S4, no murmur  MS: no gross musculoskeletal defects noted      Signed Electronically by: Pauly Juarez PA-C    "

## 2025-03-19 NOTE — LETTER
[facility]  -- Controlled Medication Agreement    3/19/2025   Arcelia Andersen   1995   [id number]       I understand that my provider is prescribing controlled medications to assist me in managing my ADHD.  The risks, benefits, and side effects of these medications have been explained to me and I agree to the following conditions for this type of treatment.    Stimulant Medication Prescribed: Vyvanse    1.  I will take my medications exactly as prescribed and will not change the medication dosage or schedule without my provider's approval.  Refills will not be given if I  runs out early.     2.  I will keep all regular appointments at this clinic.  If there are three or more missed appointments or appointments canceled less than 2 hours before the scheduled time, my medication may be discontinued.    3.  I understand that prescriptions may only be written for one month at a time, and a written prescription is required each month.  Prescriptions cannot be called in or faxed to the pharmacy.    4.  If the prescription is lost or stolen, replacement is at the discretion of my provider.  I understand that this may mean the prescription might not be replaced.    5.  If I am late for scheduled follow up, I understand that I must make an appointment and that another refill is at the discretion of my provider.  This may mean a prescription for only the amount required until the appointment, regardless of prescription co-pay.  For example, if an appointment is made in 1 week, a prescription might only be written for 7 pills.      I understand that if I violate any of the above conditions, my prescription medications and/or treatment may be terminated.  If the violation includes providing controlled substances to anyone other than to whom the medication is prescribed, a report may be made to my child's physician, pharmacy, and other authorities, including the police.    I have read this contract and it  has been explained to me.  I fully understand the consequences of violating this agreement.    ____________________________/______________/__________________________    Patient signature/Date/Witness

## 2025-03-20 LAB — CREAT UR-MCNC: 53 MG/DL

## 2025-03-20 NOTE — PROGRESS NOTES
"CNM PROGRESS NOTE    SUBJECTIVE:  Arcelia states contractions are stronger, more uncomfortable. She is sitting at the bedside on the labor ball using nitrous oxide. She continues to leak scant amount of yellow fluid.     OBJECTIVE:  /74 (Cuff Size: Adult Regular)   Temp 98.1  F (36.7  C) (Oral)   Resp 17   Ht 1.702 m (5' 7\")   Wt 87.1 kg (192 lb)   LMP 2022 (Exact Date)   BMI 30.07 kg/m      Fetal heart tones: Baseline 130   Variability: moderate  Accelerations: present  Decelerations: absent    Contractions: Pt is rossana every 1-3 minutes, lasting 30-60 seconds and palpates moderate    Cervix: 4/ 75% / 0, Vtx, forebag felt on exam   ROM: light meconium fluid     Pitocin- none  Antibiotics- none  Cervical ripening: N/A    Labor events:    Cervix 3cm / 60% / -1,  clinic visit    23  0545 - SROM at home, yellow fluid  0730 - admission to hospital, SROM w/ light mec confirmed, in early labor with regular contractions, cervical exam deferred   1100 - cervix 4cm / 75% / 0, mid, soft, forebag felt. Contractions picking up    ASSESSMENT:  IUP @ 40w2d, SROM early labor making cervical change, contractions increasing in frequency/intensity   GBS- negative  Reassuring fetal status   SROM - light meconium, ruptured 5.5 hrs, afebrile      PLAN:   Arcelia desires to use IV Fentanyl for pain relief as she feels the nitrous isn't helping. She states she will likely want an epidural at some point.   CEFM with Lori   Continue expectant management as pt is making cervical change and contractions increasing in frequency/intensity   comfort measures prn  Anticipate   reevaluate in 2-4 hours/PRN    Carol Clemente CNM    " "Postoperative Care  TONSILLECTOMY AND ADENOIDECTOMY  Fredy Hoffmann M.D.    DO NOT [facility] FOR [facility]ATIVE PROBLEMS. CALL CLINIC -[phone] OR [facility]  -[phone] AND ASK FOR ENT ON CALL.    The tonsils are two pads of tissue that sit at the back of the throat.  The adenoids are formed from the same tissue but sit up behind the nose.  In cases of sleep disordered breathing due to enlargement of these tissues or recurrent infection of these tissues, tonsillectomy with or without adenoidectomy may be indicated.    Surgery:   Removal of the tonsils and adenoids requires general anesthesia.  The procedure typically lasts 30-40 minutes followed by observation in the recovery room until the patient is tolerating liquids. (Typically 1 hour.)  In cases where the patient cannot tolerate liquids, is less than 3 years old or has poor pain control, he/she may be observed overnight.    Postoperative Diet  The most important concern after surgery is dehydration.  The patient needs to drink plenty of fluids.  If he/she feels like eating, any food that does not have sharp edges is acceptable. If it "crunches" it is off limits.  I recommend trying a very small piece/sip of  acidic or spicy items before eating/drinking a large amount as they may cause pain.  If the patient is unable to drink an adequate amount of fluids, he/she needs to be seen in the Emergency Department where fluids can be given intravenously.    Suggested fluid intake:       Weight in Pounds Minimal fluid in 24 hours   Over 20 pounds 36 ounces   Over 30 pounds 42 ounces   Over 40 pounds 50 ounces   Over 50 pounds 58 ounces   Over 60 pounds 68 ounces     Postoperative Pain Control  Patients can have a severe sore throat for approximately 7-10 days after surgery.  This can vary depending on pain tolerance, age, and frequency of infections prior to surgery.  There are typically two times when the pain is most severe: the day " following surgery and 5-7 days after surgery when the eschar (scabs) begin to fall off.  It is this second peak that is the most important for controlling pain and encouraging fluids as dehydration at this point may lead to bleeding.    Your child will be given a prescription for pain medication (typically hydrocodone/acetaminophen given up to every 4 hours ) and may also take Ibuprofen (motrin) up to every 6 hours.  These medications can be alternated so that one or the other can be given every 4 hours. Your child has also been given a steroid. They will take 6 mg every other day starting the day after surgery (5 doses over 10 days).  If pain cannot be contolled with oral medications the patient needs to be seen in the Emergency room for IV pain medication.  Your child can also take 1 teaspoon of honey every 6 hours if they are not diabetic. This has been shown to help control pain in the post-operative period.    Bleeding  There is a 1-3% risk of bleeding. This can appear as spitting up bright red blood or vomiting old clots.  Please call the clinic or ENT on call and go to your nearest Emergency Room for any bleeding.  Again, adequate hydration can usually prevent bleeding.  Often rehydration with IV fluids will resolve the problem.  Occasionally the patient will need to return to the OR for cautery.    Frequently asked questions:   Postoperative fever is common after surgery.  It can reach as high as 102F.  Use the motrin and lortab to control this.  If there is a fever as well as a new symptom such as cough, call the clinic.  Following tonsillectomy there will be two large white patches on the back of the throat. These are essentially wet scabs from the surgery. It is not thrush or infection.  Over the next week, these scabs will resolve.  Frequently, patients will complain of ear pain.  This is referred pain from the throat.  Treat it as throat pain with pain medication.  Frequently patients will have  halitosis after surgery.  Avoid mouth washes as they contain alcohol and may sting.  Brushing the teeth is okay.  Use of straws and sippy cups are okay.  Your child may complain that he or she tastes something different or strange after surgery, this is not uncommon.  As long as the patient is under observation, you do not need to limit activity.  In fact, patients that feel like doing light activity are usually those with good pain control and hydration.  The new guidelines show that antibiotics are not recommended after surgery as they do not help with pain or fever.  For this reason, your child will not have any antibiotics after surgery.      Inferior Turbinate Reduction    What are inferior turbinates?   Inferior turbinates are small shelves of bone covered in mucosa on the lateral aspect of the nasal passages on both sides. They serve to warm and humidify the air that passes through the nose. Sometimes in chronic inflammatory states, they stay enlarged to the point that normal air passage through the nose is hindered. In these instances, the turbinates can be reduced in size.     What is expected following Inferior Turbinate Reduction?  Your child will have no diet restrictions or activity restrictions after surgery.  There may be light bloody drainage from the nose for the first 2-3 days after surgery. You may place a moustache dressing under the nose for this.   There may be mild pain for the first 2-3 days after surgery. This can be treated with acetaminophen or ibuprofen.   Nasal saline spray or drops are recommended twice a day to reduce crusting in the nose. Inevitably, there will be more crusting initially postoperatively because the mucosa is healing. This should only be temporary, lasting about a week. Saline helps soften crusts and speeds up the healing process.   You can't overdose on saline nasal spray - if your child tolerates sprays, please feel free to use the spray more often! Suctioning the  nose after the spray is optional.  Please use Afrin twice a day for 5 days  Please use nasal saline gel/ Ayr gel three times a day for a week  Don't be surprised if your child experiences a temporary worsening in nasal congestion after surgery - this can be due to rebound congestion or crusting as mentioned above. It should get better after about a week.   Regular diet - no restrictions.  A post-operative appointment will be scheduled for about 3 weeks after surgery.     What are some reasons to contact your doctor after surgery?  Nausea, vomiting and/or fatigue may occur for a few hours after surgery. However, if the nausea or vomiting lasts for more than 12 hours, you should contact your doctor.  Any bloody nasal drainage or vomiting blood   Green/yellow nasal drainage persisting longer than just a few days

## 2025-03-24 LAB
AMPHET UR CFM-MCNC: 2540 NG/ML
AMPHET/CREAT UR: 4792 NG/MG {CREAT}

## 2025-06-30 ENCOUNTER — MYC REFILL (OUTPATIENT)
Dept: FAMILY MEDICINE | Facility: CLINIC | Age: 30
End: 2025-06-30
Payer: COMMERCIAL

## 2025-06-30 DIAGNOSIS — F41.8 MIXED ANXIETY AND DEPRESSIVE DISORDER: ICD-10-CM

## 2025-06-30 DIAGNOSIS — F90.2 ADHD (ATTENTION DEFICIT HYPERACTIVITY DISORDER), COMBINED TYPE: ICD-10-CM

## 2025-07-01 RX ORDER — LISDEXAMFETAMINE DIMESYLATE 30 MG/1
30 CAPSULE ORAL DAILY
Qty: 30 CAPSULE | Refills: 0 | Status: SHIPPED | OUTPATIENT
Start: 2025-07-01

## 2025-07-31 ENCOUNTER — MYC REFILL (OUTPATIENT)
Dept: FAMILY MEDICINE | Facility: CLINIC | Age: 30
End: 2025-07-31
Payer: COMMERCIAL

## 2025-07-31 DIAGNOSIS — F90.2 ADHD (ATTENTION DEFICIT HYPERACTIVITY DISORDER), COMBINED TYPE: ICD-10-CM

## 2025-08-04 RX ORDER — LISDEXAMFETAMINE DIMESYLATE 30 MG/1
30 CAPSULE ORAL DAILY
Qty: 30 CAPSULE | Refills: 0 | Status: SHIPPED | OUTPATIENT
Start: 2025-08-04

## 2025-09-02 ENCOUNTER — OFFICE VISIT (OUTPATIENT)
Dept: FAMILY MEDICINE | Facility: CLINIC | Age: 30
End: 2025-09-02
Payer: COMMERCIAL

## 2025-09-02 VITALS
BODY MASS INDEX: 26.21 KG/M2 | RESPIRATION RATE: 20 BRPM | HEART RATE: 71 BPM | OXYGEN SATURATION: 100 % | DIASTOLIC BLOOD PRESSURE: 68 MMHG | WEIGHT: 167 LBS | TEMPERATURE: 98.9 F | SYSTOLIC BLOOD PRESSURE: 102 MMHG | HEIGHT: 67 IN

## 2025-09-02 DIAGNOSIS — R53.83 FATIGUE, UNSPECIFIED TYPE: ICD-10-CM

## 2025-09-02 DIAGNOSIS — F41.1 ANXIETY STATE: ICD-10-CM

## 2025-09-02 DIAGNOSIS — R31.29 MICROSCOPIC HEMATURIA: ICD-10-CM

## 2025-09-02 DIAGNOSIS — L65.9 HAIR LOSS: ICD-10-CM

## 2025-09-02 DIAGNOSIS — R58 ECCHYMOSIS: ICD-10-CM

## 2025-09-02 DIAGNOSIS — F33.0 MILD RECURRENT MAJOR DEPRESSION: ICD-10-CM

## 2025-09-02 DIAGNOSIS — M25.50 MULTIPLE JOINT PAIN: Primary | ICD-10-CM

## 2025-09-02 DIAGNOSIS — F90.2 ADHD (ATTENTION DEFICIT HYPERACTIVITY DISORDER), COMBINED TYPE: ICD-10-CM

## 2025-09-02 DIAGNOSIS — Z13.220 SCREENING FOR HYPERLIPIDEMIA: ICD-10-CM

## 2025-09-02 LAB
ALBUMIN SERPL BCG-MCNC: 4.5 G/DL (ref 3.5–5.2)
ALBUMIN UR-MCNC: NEGATIVE MG/DL
ALP SERPL-CCNC: 53 U/L (ref 40–150)
ALT SERPL W P-5'-P-CCNC: 11 U/L (ref 0–50)
ANION GAP SERPL CALCULATED.3IONS-SCNC: 10 MMOL/L (ref 7–15)
APPEARANCE UR: ABNORMAL
AST SERPL W P-5'-P-CCNC: 17 U/L (ref 0–45)
BACTERIA #/AREA URNS HPF: ABNORMAL /HPF
BILIRUB SERPL-MCNC: 0.5 MG/DL
BILIRUB UR QL STRIP: NEGATIVE
BUN SERPL-MCNC: 11.9 MG/DL (ref 6–20)
CALCIUM SERPL-MCNC: 9.3 MG/DL (ref 8.8–10.4)
CHLORIDE SERPL-SCNC: 105 MMOL/L (ref 98–107)
CHOLEST SERPL-MCNC: 205 MG/DL
COLOR UR AUTO: YELLOW
CREAT SERPL-MCNC: 0.77 MG/DL (ref 0.51–0.95)
CRP SERPL-MCNC: <3 MG/L
EGFRCR SERPLBLD CKD-EPI 2021: >90 ML/MIN/1.73M2
ERYTHROCYTE [DISTWIDTH] IN BLOOD BY AUTOMATED COUNT: 12.6 % (ref 10–15)
ERYTHROCYTE [SEDIMENTATION RATE] IN BLOOD BY WESTERGREN METHOD: 19 MM/HR (ref 0–20)
FASTING STATUS PATIENT QL REPORTED: ABNORMAL
GLUCOSE SERPL-MCNC: 88 MG/DL (ref 70–99)
GLUCOSE UR STRIP-MCNC: NEGATIVE MG/DL
HCO3 SERPL-SCNC: 24 MMOL/L (ref 22–29)
HCT VFR BLD AUTO: 37.5 % (ref 35–47)
HDLC SERPL-MCNC: 76 MG/DL
HGB BLD-MCNC: 12.1 G/DL (ref 11.7–15.7)
HGB UR QL STRIP: ABNORMAL
IRON BINDING CAPACITY (ROCHE): 317 UG/DL (ref 240–430)
IRON SATN MFR SERPL: 21 % (ref 15–46)
IRON SERPL-MCNC: 66 UG/DL (ref 37–145)
KETONES UR STRIP-MCNC: NEGATIVE MG/DL
LDLC SERPL CALC-MCNC: 122 MG/DL
LEUKOCYTE ESTERASE UR QL STRIP: NEGATIVE
MCH RBC QN AUTO: 28.4 PG (ref 26.5–33)
MCHC RBC AUTO-ENTMCNC: 32.3 G/DL (ref 31.5–36.5)
MCV RBC AUTO: 88 FL (ref 78–100)
NITRATE UR QL: NEGATIVE
NONHDLC SERPL-MCNC: 129 MG/DL
PH UR STRIP: 7 [PH] (ref 5–7)
PLATELET # BLD AUTO: 269 10E3/UL (ref 150–450)
POTASSIUM SERPL-SCNC: 4.7 MMOL/L (ref 3.4–5.3)
PROT SERPL-MCNC: 7.5 G/DL (ref 6.4–8.3)
RBC # BLD AUTO: 4.26 10E6/UL (ref 3.8–5.2)
RBC #/AREA URNS AUTO: ABNORMAL /HPF
SODIUM SERPL-SCNC: 139 MMOL/L (ref 135–145)
SP GR UR STRIP: 1.01 (ref 1–1.03)
SQUAMOUS #/AREA URNS AUTO: ABNORMAL /LPF
TRIGL SERPL-MCNC: 37 MG/DL
TSH SERPL DL<=0.005 MIU/L-ACNC: 1.43 UIU/ML (ref 0.3–4.2)
UROBILINOGEN UR STRIP-ACNC: 0.2 E.U./DL
WBC # BLD AUTO: 3.67 10E3/UL (ref 4–11)
WBC #/AREA URNS AUTO: ABNORMAL /HPF

## 2025-09-02 PROCEDURE — 83540 ASSAY OF IRON: CPT

## 2025-09-02 PROCEDURE — 80061 LIPID PANEL: CPT

## 2025-09-02 PROCEDURE — G2211 COMPLEX E/M VISIT ADD ON: HCPCS

## 2025-09-02 PROCEDURE — 3074F SYST BP LT 130 MM HG: CPT

## 2025-09-02 PROCEDURE — 36415 COLL VENOUS BLD VENIPUNCTURE: CPT

## 2025-09-02 PROCEDURE — 86140 C-REACTIVE PROTEIN: CPT

## 2025-09-02 PROCEDURE — 83550 IRON BINDING TEST: CPT

## 2025-09-02 PROCEDURE — 85027 COMPLETE CBC AUTOMATED: CPT

## 2025-09-02 PROCEDURE — 81001 URINALYSIS AUTO W/SCOPE: CPT

## 2025-09-02 PROCEDURE — 99214 OFFICE O/P EST MOD 30 MIN: CPT

## 2025-09-02 PROCEDURE — 80053 COMPREHEN METABOLIC PANEL: CPT

## 2025-09-02 PROCEDURE — 84443 ASSAY THYROID STIM HORMONE: CPT

## 2025-09-02 PROCEDURE — 85652 RBC SED RATE AUTOMATED: CPT

## 2025-09-02 PROCEDURE — 86038 ANTINUCLEAR ANTIBODIES: CPT

## 2025-09-02 PROCEDURE — 3078F DIAST BP <80 MM HG: CPT

## 2025-09-02 RX ORDER — BUPROPION HYDROCHLORIDE 150 MG/1
150 TABLET ORAL EVERY MORNING
Qty: 90 TABLET | Refills: 3 | Status: SHIPPED | OUTPATIENT
Start: 2025-09-02

## 2025-09-02 RX ORDER — LISDEXAMFETAMINE DIMESYLATE 30 MG/1
30 CAPSULE ORAL EVERY MORNING
Qty: 30 CAPSULE | Refills: 0 | Status: SHIPPED | OUTPATIENT
Start: 2025-10-02 | End: 2025-11-01

## 2025-09-02 RX ORDER — LISDEXAMFETAMINE DIMESYLATE 30 MG/1
30 CAPSULE ORAL EVERY MORNING
Qty: 30 CAPSULE | Refills: 0 | Status: SHIPPED | OUTPATIENT
Start: 2025-09-02 | End: 2025-10-02

## 2025-09-02 RX ORDER — LISDEXAMFETAMINE DIMESYLATE 30 MG/1
30 CAPSULE ORAL EVERY MORNING
Qty: 30 CAPSULE | Refills: 0 | Status: SHIPPED | OUTPATIENT
Start: 2025-11-01 | End: 2025-12-01

## 2025-09-02 ASSESSMENT — ANXIETY QUESTIONNAIRES
1. FEELING NERVOUS, ANXIOUS, OR ON EDGE: NOT AT ALL
5. BEING SO RESTLESS THAT IT IS HARD TO SIT STILL: NOT AT ALL
GAD7 TOTAL SCORE: 0
2. NOT BEING ABLE TO STOP OR CONTROL WORRYING: NOT AT ALL
IF YOU CHECKED OFF ANY PROBLEMS ON THIS QUESTIONNAIRE, HOW DIFFICULT HAVE THESE PROBLEMS MADE IT FOR YOU TO DO YOUR WORK, TAKE CARE OF THINGS AT HOME, OR GET ALONG WITH OTHER PEOPLE: NOT DIFFICULT AT ALL
7. FEELING AFRAID AS IF SOMETHING AWFUL MIGHT HAPPEN: NOT AT ALL
3. WORRYING TOO MUCH ABOUT DIFFERENT THINGS: NOT AT ALL
6. BECOMING EASILY ANNOYED OR IRRITABLE: NOT AT ALL
GAD7 TOTAL SCORE: 0

## 2025-09-02 ASSESSMENT — PATIENT HEALTH QUESTIONNAIRE - PHQ9
5. POOR APPETITE OR OVEREATING: NOT AT ALL
10. IF YOU CHECKED OFF ANY PROBLEMS, HOW DIFFICULT HAVE THESE PROBLEMS MADE IT FOR YOU TO DO YOUR WORK, TAKE CARE OF THINGS AT HOME, OR GET ALONG WITH OTHER PEOPLE: NOT DIFFICULT AT ALL
SUM OF ALL RESPONSES TO PHQ QUESTIONS 1-9: 2
SUM OF ALL RESPONSES TO PHQ QUESTIONS 1-9: 2

## 2025-09-03 LAB — ANA SER QL IF: NEGATIVE

## 2025-09-04 ASSESSMENT — PATIENT HEALTH QUESTIONNAIRE - PHQ9: SUM OF ALL RESPONSES TO PHQ QUESTIONS 1-9: 2
